# Patient Record
Sex: FEMALE | Race: WHITE | HISPANIC OR LATINO | Employment: UNEMPLOYED | URBAN - METROPOLITAN AREA
[De-identification: names, ages, dates, MRNs, and addresses within clinical notes are randomized per-mention and may not be internally consistent; named-entity substitution may affect disease eponyms.]

---

## 2017-08-12 PROCEDURE — 99284 EMERGENCY DEPT VISIT MOD MDM: CPT

## 2017-08-13 ENCOUNTER — HOSPITAL ENCOUNTER (EMERGENCY)
Facility: MEDICAL CENTER | Age: 37
End: 2017-08-13
Attending: EMERGENCY MEDICINE
Payer: COMMERCIAL

## 2017-08-13 VITALS
HEART RATE: 67 BPM | OXYGEN SATURATION: 94 % | DIASTOLIC BLOOD PRESSURE: 54 MMHG | WEIGHT: 164.68 LBS | SYSTOLIC BLOOD PRESSURE: 122 MMHG | BODY MASS INDEX: 28.12 KG/M2 | TEMPERATURE: 98.2 F | HEIGHT: 64 IN | RESPIRATION RATE: 18 BRPM

## 2017-08-13 DIAGNOSIS — G44.209 TENSION-TYPE HEADACHE, NOT INTRACTABLE, UNSPECIFIED CHRONICITY PATTERN: ICD-10-CM

## 2017-08-13 DIAGNOSIS — Z34.90 PREGNANCY, UNSPECIFIED GESTATIONAL AGE: ICD-10-CM

## 2017-08-13 LAB — HCG SERPL QL: POSITIVE

## 2017-08-13 PROCEDURE — 84703 CHORIONIC GONADOTROPIN ASSAY: CPT

## 2017-08-13 PROCEDURE — 700105 HCHG RX REV CODE 258: Performed by: EMERGENCY MEDICINE

## 2017-08-13 PROCEDURE — 700111 HCHG RX REV CODE 636 W/ 250 OVERRIDE (IP): Performed by: EMERGENCY MEDICINE

## 2017-08-13 PROCEDURE — 96374 THER/PROPH/DIAG INJ IV PUSH: CPT

## 2017-08-13 PROCEDURE — 96361 HYDRATE IV INFUSION ADD-ON: CPT

## 2017-08-13 PROCEDURE — 96375 TX/PRO/DX INJ NEW DRUG ADDON: CPT

## 2017-08-13 RX ORDER — DEXAMETHASONE SODIUM PHOSPHATE 4 MG/ML
10 INJECTION, SOLUTION INTRA-ARTICULAR; INTRALESIONAL; INTRAMUSCULAR; INTRAVENOUS; SOFT TISSUE ONCE
Status: COMPLETED | OUTPATIENT
Start: 2017-08-13 | End: 2017-08-13

## 2017-08-13 RX ORDER — KETOROLAC TROMETHAMINE 30 MG/ML
30 INJECTION, SOLUTION INTRAMUSCULAR; INTRAVENOUS ONCE
Status: COMPLETED | OUTPATIENT
Start: 2017-08-13 | End: 2017-08-13

## 2017-08-13 RX ORDER — LORAZEPAM 2 MG/ML
1 INJECTION INTRAMUSCULAR ONCE
Status: COMPLETED | OUTPATIENT
Start: 2017-08-13 | End: 2017-08-13

## 2017-08-13 RX ORDER — DIPHENHYDRAMINE HYDROCHLORIDE 50 MG/ML
25 INJECTION INTRAMUSCULAR; INTRAVENOUS ONCE
Status: COMPLETED | OUTPATIENT
Start: 2017-08-13 | End: 2017-08-13

## 2017-08-13 RX ORDER — SODIUM CHLORIDE 9 MG/ML
1000 INJECTION, SOLUTION INTRAVENOUS ONCE
Status: COMPLETED | OUTPATIENT
Start: 2017-08-13 | End: 2017-08-13

## 2017-08-13 RX ORDER — ONDANSETRON 2 MG/ML
4 INJECTION INTRAMUSCULAR; INTRAVENOUS ONCE
Status: COMPLETED | OUTPATIENT
Start: 2017-08-13 | End: 2017-08-13

## 2017-08-13 RX ADMIN — LORAZEPAM 1 MG: 2 INJECTION INTRAMUSCULAR; INTRAVENOUS at 01:02

## 2017-08-13 RX ADMIN — SODIUM CHLORIDE 1000 ML: 9 INJECTION, SOLUTION INTRAVENOUS at 00:50

## 2017-08-13 RX ADMIN — DEXAMETHASONE SODIUM PHOSPHATE 10 MG: 4 INJECTION, SOLUTION INTRAMUSCULAR; INTRAVENOUS at 01:02

## 2017-08-13 RX ADMIN — KETOROLAC TROMETHAMINE 30 MG: 30 INJECTION, SOLUTION INTRAMUSCULAR at 01:02

## 2017-08-13 RX ADMIN — DIPHENHYDRAMINE HYDROCHLORIDE 25 MG: 50 INJECTION, SOLUTION INTRAMUSCULAR; INTRAVENOUS at 02:22

## 2017-08-13 RX ADMIN — ONDANSETRON 4 MG: 2 INJECTION INTRAMUSCULAR; INTRAVENOUS at 01:02

## 2017-08-13 NOTE — ED AVS SNAPSHOT
8/13/2017    Karolyn Hurley  [address] [place] [address]    Dear Karolyn:    Critical access hospital wants to ensure your discharge home is safe and you or your loved ones have had all of your questions answered regarding your care after you leave the hospital.    Below is a list of resources and contact information should you have any questions regarding your hospital stay, follow-up instructions, or active medical symptoms.    Questions or Concerns Regarding… Contact   Medical Questions Related to Your Discharge  (7 days a week, 8am-5pm) Contact a Nurse Care Coordinator   [phone]   Medical Questions Not Related to Your Discharge  (24 hours a day / 7 days a week)  Contact the Nurse Health Line   [phone]    Medications or Discharge Instructions Refer to your discharge packet   or contact your [facility] Primary Care Provider   [phone]   Follow-up Appointment(s) Schedule your appointment via [employer]   or contact Scheduling [phone]   Billing Review your statement via [employer]  or contact Billing [phone]   Medical Records Review your records via [employer]   or contact Medical Records [phone]     You may receive a telephone call within two days of discharge. This call is to make certain you understand your discharge instructions and have the opportunity to have any questions answered. You can also easily access your medical information, test results and upcoming appointments via the [employer] free online health management tool. You can learn more and sign up at [employer]. For assistance setting up your [employer] account, please call [phone].    Once again, we want to ensure your discharge home is safe and that you have a clear understanding of any next steps in your care. If you have any questions or concerns, please do not hesitate to contact us, we are here for you. Thank you for choosing [facility] for your healthcare needs.    Sincerely,    Your [facility] Healthcare Team

## 2017-08-13 NOTE — ED NOTES
Pt given d/c instructions/follow up/home care instructions, pt verbalized understanding of POC, pt to ER lobby in wheelchair, to be driven home by .

## 2017-08-13 NOTE — ED AVS SNAPSHOT
Home Care Instructions                                                                                                                Karolyn Hurley   MRN: [id number]    Department:  [facility], Emergency Dept   Date of Visit:  8/12/2017            [facility], Emergency Dept    [address] [place] [id number]    Phone:  [phone]      You were seen by     Reena Feldman M.D.      Your Diagnosis Was     Tension-type headache, not intractable, unspecified chronicity pattern     G44.209       These are the medications you received during your hospitalization from 08/12/2017 2335 to 08/13/2017 0208     Date/Time Order Dose Route Action    08/13/2017 0050 NS infusion 1,000 mL 1,000 mL Intravenous New Bag    08/13/2017 0102 ondansetron (ZOFRAN) syringe/vial injection 4 mg 4 mg Intravenous Given    08/13/2017 0102 ketorolac (TORADOL) injection 30 mg 30 mg Intravenous Given    08/13/2017 0102 lorazepam (ATIVAN) injection 1 mg 1 mg Intravenous Given    08/13/2017 0102 dexamethasone (DECADRON) injection 10 mg 10 mg Intravenous Given      Follow-up Information     1. Follow up with [facility], Emergency Dept.    Specialty:  Emergency Medicine    Why:  Return for worsening pain, vomiting, abdominal pain, vaginal bleeding or other concerns    Contact information    [address] [place] [id number] [phone]      Medication Information     Review all of your home medications and newly ordered medications with your primary doctor and/or pharmacist as soon as possible. Follow medication instructions as directed by your doctor and/or pharmacist.     Please keep your complete medication list with you and share with your physician. Update the information when medications are discontinued, doses are changed, or new medications (including over-the-counter products) are added; and carry medication information at all times in the event of emergency  situations.               Medication List      Notice     You have not been prescribed any medications.            Procedures and tests performed during your visit     BETA-HCG QUALITATIVE SERUM    IV Saline Lock        Discharge Instructions       Dolor de rajiv general sin causa   (General Headache Without Cause)   Un dolor de rajiv en general es un dolor o malestar que se siente en la taran de la rajiv o del nikole. Se desconocen las causas.   CUIDADOS EN EL HOGAR   · Cumpla con los controles médicos según las indicaciones.  · [place] sólo los medicamentos que le haya indicado el médico.  · Cuando sienta dolor de rajiv acuéstese en un cuarto oscuro y tranquilo.  · Lleve un registro diario para averiguar si ciertas cosas provocan red de rajiv. Por ejemplo, escriba:  ¨ Lo que come y kvng.  ¨ Cuánto tiempo duerme.  ¨ Todo cambio en la dieta o medicamentos.  · Relájese recibiendo masajes o margarito otras actividades relajantes.  · Coloque hielo o calor en la rajiv y el nikole batool lo indique wayne médico.  · DISMINUYA EL NIVEL DE ESTRÉS  · Siéntase con la espalda recta. No apriete los músculos (tensione).  · Si fuma, deje de hacerlo.  · Mary Alice menos alcohol.  · Consuma menos cafeína o deje de tomarla.  · Coma y duerma en horarios regulares.  · Duerma entre 7 y 9 horas o batool le indique wayne médico.  · Mantenga las luces tenues si le molesta las luces brillantes o ashley red de rajiv empeoran.  SOLICITE AYUDA DE INMEDIATO SI:   · El dolor de rajiv empeora.  · Tiene fiebre.  · Presenta rigidez en el nikole.  · Tiene dificultad para rin.  · Ashley músculos están débiles o pierde el control muscular.  · Pierde equilibrio o tiene problemas para caminar.  · Siente que se desvanece (debilidad) o se desmaya.  · Tiene síntomas intensos que son diferentes a los primeros síntomas.  · Tiene problemas con los medicamentos que le recetó wayne médico.  · El medicamento no le hace efecto.  · Siente que el dolor de rajiv es diferente a  otros red de rajiv.  · Tiene malestar estomacal (náuseas) o (vómitos).     Esta información no tiene batool fin reemplazar el consejo del médico. Asegúrese de hacerle al médico cualquier pregunta que tenga.     Document Released: 03/11/2013 Document Revised: 05/03/2016  [employer] Interactive Patient Education ©2016 [employer]            Patient Information     Patient Information    Following emergency treatment: all patient requiring follow-up care must return either to a private physician or a clinic if your condition worsens before you are able to obtain further medical attention, please return to the emergency room.     Billing Information    At [facility], we work to make the billing process streamlined for our patients.  Our Representatives are here to answer any questions you may have regarding your hospital bill.  If you have insurance coverage and have supplied your insurance information to us, we will submit a claim to your insurer on your behalf.  Should you have any questions regarding your bill, we can be reached online or by phone as follows:  Online: You are able pay your bills online or live chat with our representatives about any billing questions you may have. We are here to help Monday - Friday from 8:00am to 7:30pm and 9:00am - 12:00pm on Saturdays.  Please visit [email or web address]  for more information.   Phone:  [phone] or [phone]    Please note that your emergency physician, surgeon, pathologist, radiologist, anesthesiologist, and other specialists are not employed by [facility] and will therefore bill separately for their services.  Please contact them directly for any questions concerning their bills at the numbers below:     Emergency Physician Services:  [phone]  [facility]:  [phone]  [facility]:  [phone]  [facility]:  [phone]    1. Your final bill may vary from the  amount quoted upon discharge if all procedures are not complete at that time, or if your doctor has additional procedures of which we are not aware. You will receive an additional bill if you return to the Emergency Department at [facility] for suture removal regardless of the facility of which the sutures were placed.     2. Please arrange for settlement of this account at the emergency registration.    3. All self-pay accounts are due in full at the time of treatment.  If you are unable to meet this obligation then payment is expected within 4-5 days.     4. If you have had radiology studies (CT, X-ray, Ultrasound, MRI), you have received a preliminary result during your emergency department visit. Please contact the radiology department [phone] to receive a copy of your final result. Please discuss the Final result with your primary physician or with the follow up physician provided.     Crisis Hotline:  [place] Crisis Hotline:  3-105-KDYMIFG or 1-407.289.2199  [place] Crisis Hotline:    1-665.961.9630 or 139-344-2008         ED Discharge Follow Up Questions    1. In order to provide you with very good care, we would like to follow up with a phone call in the next few days.  May we have your permission to contact you?     YES /  NO    2. What is the best phone number to call you? (       )_____-__________    3. What is the best time to call you?      Morning  /  Afternoon  /  Evening                   Patient Signature:  ____________________________________________________________    Date:  ____________________________________________________________

## 2017-08-13 NOTE — ED NOTES
.  Chief Complaint   Patient presents with   • Headache     pt with headache for last 5 days, denies any trauma to head, aox4, co n/v states unable to sleep due to headache   • N/V     .Pt Informed regarding triage process and verbalized understanding to inform triage tech or RN for any changes in condition.  Placed in lobby.

## 2017-08-13 NOTE — ED AVS SNAPSHOT
[employer] Access Code: Activation code not generated  Current [employer] Status: Active    [employer]  A secure, online tool to manage your health information     [employer]’s [employer]® is a secure, online tool that connects you to your personalized health information from the privacy of your home -- day or night - making it very easy for you to manage your healthcare. Once the activation process is completed, you can even access your medical information using the [employer] mary, which is available for free in the Apple Mary store or Google Play store.     [employer] provides the following levels of access (as shown below):   My Chart Features   [facility] Primary Care Doctor [facility]  Specialists [facility]  Urgent  Care Non-[facility]  Primary Care  Doctor   Email your healthcare team securely and privately 24/7 X X X X   Manage appointments: schedule your next appointment; view details of past/upcoming appointments X      Request prescription refills. X      View recent personal medical records, including lab and immunizations X X X X   View health record, including health history, allergies, medications X X X X   Read reports about your outpatient visits, procedures, consult and ER notes X X X X   See your discharge summary, which is a recap of your hospital and/or ER visit that includes your diagnosis, lab results, and care plan. X X       How to register for [employer]:  1. Go to  [email or web address].  2. Click on the Sign Up Now box, which takes you to the New Member Sign Up page. You will need to provide the following information:  a. Enter your [employer] Access Code exactly as it appears at the top of this page. (You will not need to use this code after you’ve completed the sign-up process. If you do not sign up before the expiration date, you must request a new code.)   b. Enter your date of birth.   c. Enter your home email address.   d. Click Submit, and follow the next screen’s instructions.  3. Create a [employer] ID. This will  be your [employer]iza login ID and cannot be changed, so think of one that is secure and easy to remember.  4. Create a [employer] password. You can change your password at any time.  5. Enter your Password Reset Question and Answer. This can be used at a later time if you forget your password.   6. Enter your e-mail address. This allows you to receive e-mail notifications when new information is available in Analiza.  7. Click Sign Up. You can now view your health information.    For assistance activating your [employer] account, call [phone]

## 2017-08-13 NOTE — ED NOTES
This nurse called to lobby pt is laying on floor, not speaking to staff nods head to answer yes and no questions, pt family member states pt was going to leave and go to [facility] and became faint and was assisted to floor by family member, denies hitting head, pt lifted to bed and report to Ramirez MAHER

## 2017-08-13 NOTE — ED PROVIDER NOTES
"ED Provider Note    Scribed for Reena eFldman M.D. by Randal Souza. 8/13/2017, 12:24 AM.    Primary care provider: Pcp Pt States None  Means of arrival: Walk-In  History obtained from: Patient  History limited by: None    CHIEF COMPLAINT  Chief Complaint   Patient presents with   • Headache     pt with headache for last 5 days, denies any trauma to head, aox4, co n/v states unable to sleep due to headache   • N/V     HPI  Karolyn Hurley is a 36 y.o. female who presents to the Emergency Department complaining of a severe occipital headache onset 5 days ago. Due to the pain, she has been unable to sleep at all the past few days. She notes she's had a lot of stress as her brother passed away recently. The patient has a history of headaches but they are not similar in any way to her current headache. One day ago the patient started have associated nausea and vomiting. She tried Ibuprofen, Excedrin, and Melatonin one time each with mild relief of her symptoms. Denies photophobia, abdominal pain. The nurse informed me afterwards the patient's period is 1 week late.    REVIEW OF SYSTEMS  Pertinent positives include headache, nausea, vomiting. Pertinent negatives include no photophobia, abdominal pain.  All other systems reviewed and negative.    C.    PAST MEDICAL HISTORY         SURGICAL HISTORY  patient denies any surgical history    SOCIAL HISTORY  Social History   Substance Use Topics   • Smoking status: Never Smoker    • Smokeless tobacco: None   • Alcohol Use: No      History   Drug Use No       FAMILY HISTORY  History reviewed. No pertinent family history.    CURRENT MEDICATIONS  Ibuprofin, Excedrin, Melatonin    ALLERGIES  Allergies   Allergen Reactions   • Nkda [No Known Drug Allergy]        PHYSICAL EXAM  VITAL SIGNS: /54 mmHg  Pulse 79  Temp(Src) 36.8 °C (98.2 °F) (Temporal)  Resp 18  Ht 1.626 m (5' 4\")  Wt 74.7 kg (164 lb 10.9 oz)  BMI 28.25 kg/m2  SpO2 97%  LMP 07/07/2017 " (Approximate)  Breastfeeding? Unknown  Constitutional: Alert and anxious.  HENT: No signs of trauma, Bilateral external ears normal, Nose normal.   Eyes: Pupils are equal and reactive, Conjunctiva normal, Non-icteric.   Neck: Normal range of motion, No tenderness, Supple, No stridor.   Cardiovascular: Regular rate and rhythm, no murmurs.   Thorax & Lungs: Normal breath sounds, No respiratory distress, No wheezing, No chest tenderness.   Abdomen: Bowel sounds normal, Soft, No tenderness, No masses, No peritoneal signs.  Skin: Warm, Dry, No erythema, No rash.   Neurologic: Alert, moving all extremities without difficulty, no focal deficit. No neurological deficits. Appears anxious.    LABS  Results for orders placed or performed during the hospital encounter of 08/13/17   BETA-HCG QUALITATIVE SERUM   Result Value Ref Range    Beta-Hcg Qualitative Serum Positive (A) Negative     All labs reviewed by me.    COURSE & MEDICAL DECISION MAKING  Pertinent Labs & Imaging studies reviewed. (See chart for details)    Differential diagnoses include but are not limited to: Anxiety, Dehydration, Tension headache    12:24 AM - Patient seen and examined at bedside. Patient will be treated with 10mg Decadron, 1mg Ativan, 30mg Toradol, 4mg Zofran, IV fluids for hydration. Ordered Beta-HCG Qualitive Serum to evaluate her symptoms.    2:00 AM - Patient seen at bedside and is feeling better without any abdominal pain or cramping. I explained the lab results noted above which indicate the patient is pregnant. I did explain that she was given medications earlier prior to knowing that she is pregnant. She understands this. She will not take additional NSAIDs. She feels much better at this time. She does not have any symptoms concerning for infection or hemorrhage. I suspect this is tension related. The patient feels comfortable to be discharged home. She was given return precautions and understands and verbalized agreement.      The  patient will return for new or worsening symptoms and is stable at the time of discharge. Patient was given return precautions. Patient and/or family member verbalizes understanding and will comply.    DISPOSITION:  Patient will be discharged home in stable condition.    FOLLOW UP:  [facility], Emergency Dept  [address]  [place] [id number] [phone]    Return for worsening pain, vomiting, abdominal pain, vaginal bleeding or other concerns    FINAL IMPRESSION  1. Tension-type headache, not intractable, unspecified chronicity pattern    2. Pregnancy, unspecified gestational age      This dictation has been created using voice recognition software and/or scribes. The accuracy of the dictation is limited by the abilities of the software and the expertise of the scribes. I expect there may be some errors of grammar and possibly content. I made every attempt to manually correct the errors within my dictation. However, errors related to voice recognition software and/or scribes may still exist and should be interpreted within the appropriate context.     I, Randal Souza (Scribe), am scribing for, and in the presence of, Reena Feldman M.D..    Electronically signed by: Randal Souza (Scribe), 8/13/2017    Reena LARKIN M.D. personally performed the services described in this documentation, as scribed by Randal Souza in my presence, and it is both accurate and complete.    The note accurately reflects work and decisions made by me.  Reena Feldman  8/13/2017  5:12 AM

## 2017-08-13 NOTE — DISCHARGE INSTRUCTIONS
Dolor de rajiv general sin causa   (General Headache Without Cause)   Un dolor de rajiv en general es un dolor o malestar que se siente en la taran de la rajiv o del nikole. Se desconocen las causas.   CUIDADOS EN EL HOGAR   · Cumpla con los controles médicos según las indicaciones.  · [place] sólo los medicamentos que le haya indicado el médico.  · Cuando sienta dolor de rajiv acuéstese en un cuarto oscuro y tranquilo.  · Lleve un registro diario para averiguar si ciertas cosas provocan red de rajiv. Por ejemplo, escriba:  ¨ Lo que come y kvng.  ¨ Cuánto tiempo duerme.  ¨ Todo cambio en la dieta o medicamentos.  · Relájese recibiendo masajes o margarito otras actividades relajantes.  · Coloque hielo o calor en la rajiv y el nikole batool lo indique wayne médico.  · DISMINUYA EL NIVEL DE ESTRÉS  · Siéntase con la espalda recta. No apriete los músculos (tensione).  · Si fuma, deje de hacerlo.  · [place] menos alcohol.  · Consuma menos cafeína o deje de tomarla.  · Coma y duerma en horarios regulares.  · Duerma entre 7 y 9 horas o batool le indique wayne médico.  · Mantenga las luces tenues si le molesta las luces brillantes o ashley red de rajiv empeoran.  SOLICITE AYUDA DE INMEDIATO SI:   · El dolor de rajiv empeora.  · Tiene fiebre.  · Presenta rigidez en el nikole.  · Tiene dificultad para rin.  · Ashley músculos están débiles o pierde el control muscular.  · Pierde equilibrio o tiene problemas para caminar.  · Siente que se desvanece (debilidad) o se desmaya.  · Tiene síntomas intensos que son diferentes a los primeros síntomas.  · Tiene problemas con los medicamentos que le recetó wayne médico.  · El medicamento no le hace efecto.  · Siente que el dolor de rajiv es diferente a otros red de rajiv.  · Tiene malestar estomacal (náuseas) o (vómitos).     Esta información no tiene batool fin reemplazar el consejo del médico. Asegúrese de hacerle al médico cualquier pregunta que tenga.     Document Released: 03/11/2013 Document  Revised: 05/03/2016  Elsevier Interactive Patient Education ©2016 Elsevier Inc.

## 2017-08-14 ENCOUNTER — HOSPITAL ENCOUNTER (EMERGENCY)
Facility: MEDICAL CENTER | Age: 37
End: 2017-08-14
Attending: EMERGENCY MEDICINE
Payer: COMMERCIAL

## 2017-08-14 VITALS
DIASTOLIC BLOOD PRESSURE: 68 MMHG | BODY MASS INDEX: 29.3 KG/M2 | RESPIRATION RATE: 15 BRPM | WEIGHT: 165.34 LBS | SYSTOLIC BLOOD PRESSURE: 112 MMHG | HEART RATE: 84 BPM | HEIGHT: 63 IN | TEMPERATURE: 98.6 F | OXYGEN SATURATION: 97 %

## 2017-08-14 DIAGNOSIS — R51.9 OCCIPITAL HEADACHE: ICD-10-CM

## 2017-08-14 PROCEDURE — 99284 EMERGENCY DEPT VISIT MOD MDM: CPT

## 2017-08-14 PROCEDURE — A9270 NON-COVERED ITEM OR SERVICE: HCPCS | Performed by: EMERGENCY MEDICINE

## 2017-08-14 PROCEDURE — 96375 TX/PRO/DX INJ NEW DRUG ADDON: CPT

## 2017-08-14 PROCEDURE — 700102 HCHG RX REV CODE 250 W/ 637 OVERRIDE(OP): Performed by: EMERGENCY MEDICINE

## 2017-08-14 PROCEDURE — 96374 THER/PROPH/DIAG INJ IV PUSH: CPT

## 2017-08-14 PROCEDURE — 700111 HCHG RX REV CODE 636 W/ 250 OVERRIDE (IP): Performed by: EMERGENCY MEDICINE

## 2017-08-14 RX ORDER — HYDROCODONE BITARTRATE AND ACETAMINOPHEN 5; 325 MG/1; MG/1
1-2 TABLET ORAL EVERY 6 HOURS PRN
Qty: 12 TAB | Refills: 0 | Status: SHIPPED | OUTPATIENT
Start: 2017-08-14 | End: 2017-08-24

## 2017-08-14 RX ORDER — HYDROCODONE BITARTRATE AND ACETAMINOPHEN 5; 325 MG/1; MG/1
1 TABLET ORAL ONCE
Status: COMPLETED | OUTPATIENT
Start: 2017-08-14 | End: 2017-08-14

## 2017-08-14 RX ORDER — METOCLOPRAMIDE HYDROCHLORIDE 5 MG/ML
10 INJECTION INTRAMUSCULAR; INTRAVENOUS ONCE
Status: COMPLETED | OUTPATIENT
Start: 2017-08-14 | End: 2017-08-14

## 2017-08-14 RX ORDER — METOCLOPRAMIDE 10 MG/1
10 TABLET ORAL EVERY 8 HOURS PRN
Qty: 12 TAB | Refills: 0 | Status: SHIPPED | OUTPATIENT
Start: 2017-08-14 | End: 2017-09-26

## 2017-08-14 RX ORDER — ACETAMINOPHEN 325 MG/1
650 TABLET ORAL ONCE
Status: COMPLETED | OUTPATIENT
Start: 2017-08-14 | End: 2017-08-14

## 2017-08-14 RX ORDER — DIPHENHYDRAMINE HYDROCHLORIDE 50 MG/ML
25 INJECTION INTRAMUSCULAR; INTRAVENOUS ONCE
Status: COMPLETED | OUTPATIENT
Start: 2017-08-14 | End: 2017-08-14

## 2017-08-14 RX ADMIN — ACETAMINOPHEN 650 MG: 325 TABLET, FILM COATED ORAL at 19:03

## 2017-08-14 RX ADMIN — HYDROCODONE BITARTRATE AND ACETAMINOPHEN 1 TABLET: 5; 325 TABLET ORAL at 20:48

## 2017-08-14 RX ADMIN — METOCLOPRAMIDE 10 MG: 5 INJECTION, SOLUTION INTRAMUSCULAR; INTRAVENOUS at 19:03

## 2017-08-14 RX ADMIN — DIPHENHYDRAMINE HYDROCHLORIDE 25 MG: 50 INJECTION, SOLUTION INTRAMUSCULAR; INTRAVENOUS at 19:03

## 2017-08-14 ASSESSMENT — ENCOUNTER SYMPTOMS
BLURRED VISION: 1
HEADACHES: 1
VOMITING: 0
NECK PAIN: 1
TINGLING: 1
CHILLS: 1
PHOTOPHOBIA: 1
ABDOMINAL PAIN: 0
FEVER: 0

## 2017-08-14 ASSESSMENT — PAIN SCALES - GENERAL: PAINLEVEL_OUTOF10: 10

## 2017-08-14 NOTE — ED AVS SNAPSHOT
8/14/2017    Karolyn Hurley  [address] [place] [address]    Dear Karolyn:    [facility] wants to ensure your discharge home is safe and you or your loved ones have had all of your questions answered regarding your care after you leave the hospital.    Below is a list of resources and contact information should you have any questions regarding your hospital stay, follow-up instructions, or active medical symptoms.    Questions or Concerns Regarding… Contact   Medical Questions Related to Your Discharge  (7 days a week, 8am-5pm) Contact a Nurse Care Coordinator   [phone]   Medical Questions Not Related to Your Discharge  (24 hours a day / 7 days a week)  Contact the Nurse Health Line   [phone]    Medications or Discharge Instructions Refer to your discharge packet   or contact your [facility] Primary Care Provider   [phone]   Follow-up Appointment(s) Schedule your appointment via "[employer]   or contact Scheduling [phone]   Billing Review your statement via "[employer]  or contact Billing [phone]   Medical Records Review your records via "[employer]   or contact Medical Records [phone]     You may receive a telephone call within two days of discharge. This call is to make certain you understand your discharge instructions and have the opportunity to have any questions answered. You can also easily access your medical information, test results and upcoming appointments via the "[employer] free online health management tool. You can learn more and sign up at [employer]. For assistance setting up your "[employer] account, please call [phone].    Once again, we want to ensure your discharge home is safe and that you have a clear understanding of any next steps in your care. If you have any questions or concerns, please do not hesitate to contact us, we are here for you. Thank you for choosing [facility] for your healthcare needs.    Sincerely,    Your [facility] Healthcare Team

## 2017-08-14 NOTE — ED AVS SNAPSHOT
Home Care Instructions                                                                                                                Karolyn Hurley   MRN: [id number]    Department:  [facility], Emergency Dept   Date of Visit:  8/14/2017            [facility], Emergency Dept    [address] [address]    Phone:  [phone]      You were seen by     Damián Garcia M.D.      Your Diagnosis Was     Occipital headache     R51       These are the medications you received during your hospitalization from 08/14/2017 1720 to 08/14/2017 2120     Date/Time Order Dose Route Action    08/14/2017 1903 acetaminophen (TYLENOL) tablet 650 mg 650 mg Oral Given    08/14/2017 1903 metoclopramide (REGLAN) injection 10 mg 10 mg Intravenous Given    08/14/2017 1903 diphenhydrAMINE (BENADRYL) injection 25 mg 25 mg Intravenous Given    08/14/2017 2048 hydrocodone-acetaminophen (NORCO) 5-325 MG per tablet 1 Tab 1 Tab Oral Given      Follow-up Information     1. Follow up with Your Physician. Schedule an appointment as soon as possible for a visit in 3 days.    Specialty:  Emergency Medicine    Contact information    Varies          2. Follow up with [facility].    Why:  If you need a doctor    Contact information    [address]  [place] [address] [phone]          3. Follow up with [facility].    Contact information    [address] [address] [phone]      Medication Information     Review all of your home medications and newly ordered medications with your primary doctor and/or pharmacist as soon as possible. Follow medication instructions as directed by your doctor and/or pharmacist.     Please keep your complete medication list with you and share with your physician. Update the information when medications are discontinued, doses are changed, or new medications (including over-the-counter products) are added; and carry  medication information at all times in the event of emergency situations.               Medication List      START taking these medications        Instructions    Morning Afternoon Evening Bedtime    hydrocodone-acetaminophen 5-325 MG Tabs per tablet   Last time this was given:  1 Tab on 8/14/2017  8:48 PM   Commonly known as:  NORCO        Take 1-2 Tabs by mouth every 6 hours as needed.   Dose:  1-2 Tab                        metoclopramide 10 MG Tabs   Commonly known as:  REGLAN        Take 1 Tab by mouth every 8 hours as needed (Nause or headache).   Dose:  10 mg                             Where to Get Your Medications      You can get these medications from any pharmacy     Bring a paper prescription for each of these medications    - hydrocodone-acetaminophen 5-325 MG Tabs per tablet  - metoclopramide 10 MG Tabs            Procedures and tests performed during your visit     IV Saline Lock        Discharge Instructions       Return if you have increasing headache, confusion, fever, neck stiffness, vision changes, or headache will not go away at all.     Cefalea tensional   (Tension Headache)   Mayra cefalea tensional es mayra sensación de dolor y opresión en la frente y los lados de la rajiv. El dolor puede ser sordo o puede sentirse que comprime (constrictivo). Shaina es el tipo más común de dolor de rajiv. Generalmente no se asocian con náuseas o vómitos y no empeoran con la actividad física. Pueden durar desde 30 minutos a varios días.   CAUSAS   Se desconoce la causa exacta, en puede ser causada por las sustancias químicas y las hormonas del cerebro que producen dolor. Suelen comenzar después de mayra situación de estrés, ansiedad o por depresión. Otros desencadenantes pueden ser:   · El alcohol.  · Cafeína (demasiada o abstinencia).  · Infecciones respiratorias (resfriados, gripes, sinusitis).  · Problemas dentales o apretar los dientes.  · Fatiga.  · Mantener la rajiv y el nikole en mayra posición demasiado  "tiempo mientras utiliza un ordenador.  SÍNTOMAS   · Presión alrededor de la rajiv.    · Dolor \"sordo\" en la rajiv.    · Dolor que siente sobre la frente y los lados de la rajiv.    · Sensibilidad en los músculos de la rajiv, del nikole y de los hombros.  DIAGNÓSTICO   El diagnóstico se realiza en base a:   · Síntomas.    · Examen físico.    · Kaleigh TC (tomografía computada) o resonancia magnética de la rajiv. Se pueden pedir estas pruebas, si los síntomas son severos o inusuales.  TRATAMIENTO   Le recetarán medicamentos que ayudan a aliviar los síntomas.   INSTRUCCIONES PARA EL CUIDADO EN EL HOGAR   · Sólo tome medicamentos de venta george o recetados para calmar el dolor o el malestar, según las indicaciones de wayne médico.    · Cuando sienta dolor de rajiv acuéstese en un cuarto oscuro y tranquilo.    · Lleve un registro diario para averiguar lo que puede desencadenar los red de rajiv. Por ejemplo, escriba:  · Lo que come y kvng.  · Cuánto tiempo duerme.  · Todo cambio en la dieta o medicamentos.  · Trate con masajes u otras técnicas de relajación.    · Pueden utilizarse bolsas de hielo o calor aplicadas a la rajiv o al nikole. Úselos 3 a 4 veces por día de 15 a 20 minutos por vez, o batool sea necesario.    · Limite las situaciones de estrés.    · Siéntese con la espalda recta y no tense los músculos.    · Si fuma, deje de hacerlo.  · Limite el consumo de bebidas alcohólicas.  · Consuma menos cantidad de cafeína o deje de tomarla.  · Coma y margarito ejercicios regularmente.  · Duerma entre 7 y 9 horas o batool lo indique wayne médico.  · Evite el uso excesivo de medicamentos para el dolor batool el dolor recurrente de rajiv que pueden ocurrir.     SOLICITE ATENCIÓN MÉDICA SI:   · Tiene problemas con los medicamentos que le recetaron.  · El medicamento no le hace efecto.  · El dolor de rajiv que sentía habitualmente es diferente.  · Tiene náuseas o vómitos.  SOLICITE ATENCIÓN MÉDICA DE INMEDIATO SI:   · El dolor se " hace cada vez más intenso.  · Tiene fiebre.  · Presenta rigidez en el nikole.  · Sufre pérdida de la visión.  · Presenta debilidad muscular o pérdida del control muscular.  · Pierde equilibrio o tiene problemas para caminar.  · Sufre mareos o se desmaya.  · Tiene síntomas graves que son diferentes a los primeros síntomas.  ASEGÚRESE DE QUE:   · Comprende estas instrucciones.  · Controlará wayne enfermedad.  · Solicitará ayuda de inmediato si no mejora o si empeora.  Document Released: 09/27/2006 Document Revised: 03/11/2013  [employer] (ML4D)® Patient Information ©2014 [employer].                Patient Information     Patient Information    Following emergency treatment: all patient requiring follow-up care must return either to a private physician or a clinic if your condition worsens before you are able to obtain further medical attention, please return to the emergency room.     Billing Information    At [facility], we work to make the billing process streamlined for our patients.  Our Representatives are here to answer any questions you may have regarding your hospital bill.  If you have insurance coverage and have supplied your insurance information to us, we will submit a claim to your insurer on your behalf.  Should you have any questions regarding your bill, we can be reached online or by phone as follows:  Online: You are able pay your bills online or live chat with our representatives about any billing questions you may have. We are here to help Monday - Friday from 8:00am to 7:30pm and 9:00am - 12:00pm on Saturdays.  Please visit [email or web address]  for more information.   Phone:  [phone] or [phone]    Please note that your emergency physician, surgeon, pathologist, radiologist, anesthesiologist, and other specialists are not employed by [facility] and will therefore bill separately for their services.  Please contact them directly for any questions concerning their  bills at the numbers below:     Emergency Physician Services:  [phone]  [facility]:  [phone]  [facility]:  [phone]  [facility]:  [phone]    1. Your final bill may vary from the amount quoted upon discharge if all procedures are not complete at that time, or if your doctor has additional procedures of which we are not aware. You will receive an additional bill if you return to the Emergency Department at [facility] for suture removal regardless of the facility of which the sutures were placed.     2. Please arrange for settlement of this account at the emergency registration.    3. All self-pay accounts are due in full at the time of treatment.  If you are unable to meet this obligation then payment is expected within 4-5 days.     4. If you have had radiology studies (CT, X-ray, Ultrasound, MRI), you have received a preliminary result during your emergency department visit. Please contact the radiology department [phone] to receive a copy of your final result. Please discuss the Final result with your primary physician or with the follow up physician provided.     Crisis Hotline:  [place] Crisis Hotline:  1-220-ANSBZUI or 1-728.576.3064  [place] Crisis Hotline:    1-855.647.9838 or 538-554-2748         ED Discharge Follow Up Questions    1. In order to provide you with very good care, we would like to follow up with a phone call in the next few days.  May we have your permission to contact you?     YES /  NO    2. What is the best phone number to call you? (       )_____-__________    3. What is the best time to call you?      Morning  /  Afternoon  /  Evening                   Patient Signature:  ____________________________________________________________    Date:  ____________________________________________________________

## 2017-08-14 NOTE — ED AVS SNAPSHOT
Buzztala Access Code: Activation code not generated  Current Buzztala Status: Active    [employer]uuConnecthart  A secure, online tool to manage your health information     [facility]’s Buzztala® is a secure, online tool that connects you to your personalized health information from the privacy of your home -- day or night - making it very easy for you to manage your healthcare. Once the activation process is completed, you can even access your medical information using the Buzztala mary, which is available for free in the Apple Mary store or Google Play store.     Buzztala provides the following levels of access (as shown below):   My Chart Features   [facility] Primary Care Doctor [facility]  Specialists [facility]  Urgent  Care Non-[facility]  Primary Care  Doctor   Email your healthcare team securely and privately 24/7 X X X X   Manage appointments: schedule your next appointment; view details of past/upcoming appointments X      Request prescription refills. X      View recent personal medical records, including lab and immunizations X X X X   View health record, including health history, allergies, medications X X X X   Read reports about your outpatient visits, procedures, consult and ER notes X X X X   See your discharge summary, which is a recap of your hospital and/or ER visit that includes your diagnosis, lab results, and care plan. X X       How to register for Buzztala:  1. Go to  [email or web address].  2. Click on the Sign Up Now box, which takes you to the New Member Sign Up page. You will need to provide the following information:  a. Enter your Buzztala Access Code exactly as it appears at the top of this page. (You will not need to use this code after you’ve completed the sign-up process. If you do not sign up before the expiration date, you must request a new code.)   b. Enter your date of birth.   c. Enter your home email address.   d. Click Submit, and follow the next screen’s instructions.  3. Create a Buzztala ID. This will  be your [employer] login ID and cannot be changed, so think of one that is secure and easy to remember.  4. Create a [employer] password. You can change your password at any time.  5. Enter your Password Reset Question and Answer. This can be used at a later time if you forget your password.   6. Enter your e-mail address. This allows you to receive e-mail notifications when new information is available in [employer].  7. Click Sign Up. You can now view your health information.    For assistance activating your [employer] account, call [phone]

## 2017-08-15 NOTE — DISCHARGE INSTRUCTIONS
"Return if you have increasing headache, confusion, fever, neck stiffness, vision changes, or headache will not go away at all.     Cefalea tensional   (Tension Headache)   Mayra cefalea tensional es mayra sensación de dolor y opresión en la frente y los lados de la rajiv. El dolor puede ser sordo o puede sentirse que comprime (constrictivo). Shaina es el tipo más común de dolor de rajiv. Generalmente no se asocian con náuseas o vómitos y no empeoran con la actividad física. Pueden durar desde 30 minutos a varios días.   CAUSAS   Se desconoce la causa exacta, en puede ser causada por las sustancias químicas y las hormonas del cerebro que producen dolor. Suelen comenzar después de mayra situación de estrés, ansiedad o por depresión. Otros desencadenantes pueden ser:   · El alcohol.  · Cafeína (demasiada o abstinencia).  · Infecciones respiratorias (resfriados, gripes, sinusitis).  · Problemas dentales o apretar los dientes.  · Fatiga.  · Mantener la rajiv y el nikole en mayra posición demasiado tiempo mientras utiliza un ordenador.  SÍNTOMAS   · Presión alrededor de la rajiv.    · Dolor \"sordo\" en la rajiv.    · Dolor que siente sobre la frente y los lados de la rajiv.    · Sensibilidad en los músculos de la rajiv, del nikole y de los hombros.  DIAGNÓSTICO   El diagnóstico se realiza en base a:   · Síntomas.    · Examen físico.    · Mayra TC (tomografía computada) o resonancia magnética de la rajiv. Se pueden pedir estas pruebas, si los síntomas son severos o inusuales.  TRATAMIENTO   Le recetarán medicamentos que ayudan a aliviar los síntomas.   INSTRUCCIONES PARA EL CUIDADO EN EL HOGAR   · Sólo tome medicamentos de venta george o recetados para calmar el dolor o el malestar, según las indicaciones de wayne médico.    · Cuando sienta dolor de rajiv acuéstese en un cuarto oscuro y tranquilo.    · Lleve un registro diario para averiguar lo que puede desencadenar los red de rajiv. Por ejemplo, escriba:  · Lo que come y " kvng.  · Cuánto tiempo duerme.  · Todo cambio en la dieta o medicamentos.  · Trate con masajes u otras técnicas de relajación.    · Pueden utilizarse bolsas de hielo o calor aplicadas a la rajiv o al nikole. Úselos 3 a 4 veces por día de 15 a 20 minutos por vez, o batool sea necesario.    · Limite las situaciones de estrés.    · Siéntese con la espalda recta y no tense los músculos.    · Si fuma, deje de hacerlo.  · Limite el consumo de bebidas alcohólicas.  · Consuma menos cantidad de cafeína o deje de tomarla.  · Coma y margarito ejercicios regularmente.  · Duerma entre 7 y 9 horas o batool lo indique wayne médico.  · Evite el uso excesivo de medicamentos para el dolor batool el dolor recurrente de rajiv que pueden ocurrir.     SOLICITE ATENCIÓN MÉDICA SI:   · Tiene problemas con los medicamentos que le recetaron.  · El medicamento no le hace efecto.  · El dolor de rajiv que sentía habitualmente es diferente.  · Tiene náuseas o vómitos.  SOLICITE ATENCIÓN MÉDICA DE INMEDIATO SI:   · El dolor se hace cada vez más intenso.  · Tiene fiebre.  · Presenta rigidez en el nikole.  · Sufre pérdida de la visión.  · Presenta debilidad muscular o pérdida del control muscular.  · Pierde equilibrio o tiene problemas para caminar.  · Sufre mareos o se desmaya.  · Tiene síntomas graves que son diferentes a los primeros síntomas.  ASEGÚRESE DE QUE:   · Comprende estas instrucciones.  · Controlará wayne enfermedad.  · Solicitará ayuda de inmediato si no mejora o si empeora.  Document Released: 09/27/2006 Document Revised: 03/11/2013  ExitCare® Patient Information ©2014 [employer].

## 2017-08-15 NOTE — ED NOTES
Pt amb to triage.  Chief Complaint   Patient presents with   • Head Ache     x1wk, seen here for the same several days ago, no relief w/ otc meds

## 2017-08-15 NOTE — ED PROVIDER NOTES
ED Provider Note    Scribed for Damián Garcia M.D. by Mary Kruse. 2017, 6:36 PM.    Primary care provider: Pcp Pt States None  Means of arrival: walk in   History obtained from: patient   History limited by: none       CHIEF COMPLAINT  Chief Complaint   Patient presents with   • Head Ache     x1wk, seen here for the same several days ago, no relief w/ otc meds       HPI  Karolyn Hurley is a 36 y.o. female who presents to the Emergency Department complaining of an intermittent headache onset one week ago. She came into the ED today after she was unable to sleep due to her pain. Her headache is located to her posterior head and bilateral temples. She confirms her headache developed gradually with onset. Light exacerbates her headache. Patient denies history of frequent headaches or migraines and reports that she normally has a headache once every few months. She denies history of similar headaches. Patient confirms recent increase in her daily stress after the recent death of her brother. Patient has been taking tylenol with no relief of her symptoms.  She has associated neck pain with tingling, intermittent chills and blurred vision. She denies recent vomiting, abdominal pain, vaginal bleeding and fever. The patient was seen and evaluated yesterday for the same complaint. She had a positive pregnancy test with this visit. Her last menstrual period was .       REVIEW OF SYSTEMS  Review of Systems   Constitutional: Positive for chills. Negative for fever.   Eyes: Positive for blurred vision and photophobia.   Gastrointestinal: Negative for vomiting and abdominal pain.   Genitourinary:        No vaginal bleeding    Musculoskeletal: Positive for neck pain.   Neurological: Positive for tingling and headaches.   E.      PAST MEDICAL HISTORY   Her obstetric history is .       SURGICAL HISTORY  patient denies any surgical history      SOCIAL HISTORY  Social History   Substance Use Topics  "  • Smoking status: Never Smoker         • Alcohol Use: No      History   Drug Use No       FAMILY HISTORY  None noted       CURRENT MEDICATIONS  Home Medications     Reviewed by Trinh Solis R.N. (Registered Nurse) on 08/14/17 at 1848  Med List Status: Not Addressed    Medication Last Dose Status          Patient Juma Taking any Medications                        ALLERGIES  Allergies   Allergen Reactions   • Nkda [No Known Drug Allergy]          PHYSICAL EXAM  VITAL SIGNS: /67 mmHg  Pulse 90  Temp(Src) 37.4 °C (99.3 °F) (Temporal)  Resp 16  Ht 1.6 m (5' 3\")  Wt 75 kg (165 lb 5.5 oz)  BMI 29.30 kg/m2  SpO2 97%  LMP 07/07/2017 (Approximate)  Constitutional: Well developed, Well nourished, mild distress.   HENT: Normocephalic, Atraumatic  Eyes: Conjunctiva normal, No discharge.   Neck: Supple, No stridor, muscle tightness and tenderness from the occiput down the bilateral neck.  Full range of motion with no signs of meningismus.   Cardiovascular: Normal heart rate, Normal rhythm, No murmurs, equal pulses.   Pulmonary: Normal breath sounds, No respiratory distress, No wheezing, No rales, No rhonchi.  Abdomen: Soft, No tenderness, No masses, no rebound, no guarding.   Back: No CVA tenderness.   Musculoskeletal: No major deformities noted, No tenderness.   Skin: Warm, Dry, No erythema, No rash.   Neurologic: Alert & oriented x 3, Normal motor function,  No focal deficits noted. 5/5 strength to the bilateral upper and lower extremities.   Psychiatric: Affect normal, Judgment normal, Mood normal.           COURSE & MEDICAL DECISION MAKING  Pertinent Labs & Imaging studies reviewed. (See chart for details)    6:30 PM Obtained and reviewed past medical records which indicate the patient was seen and evaluated yesterday. She was treated with Decadron, Ativan, Zofran and IV fluids. Her labs from yesterday indicated a positive pregnancy test.     6:36 PM - Patient seen and examined at bedside. She " understands the her diagnostic options are limited secondary to her pregnancy. Patient will be treated with tylenol 650 mg, reglan 10 mg and benadryl 25 mg.     7:48 PM Patient reevaluated at bedside. Her headache is improved from a 10/10 to an 8/10.     8:17 PM Patient reevaluated at bedside. Patient reports persistent headache that is unchanged in severity.  Discussed medication options extensively with the patient and she agrees to being treated with a class C medication after discussion of associated risks. Ordered norco 5-325 mg.    8:53 PM Patient reevaluated at bedside. Patient is resting comfortably and confirms improvement of her headache. She agrees to discharge home. Patient understands that she should return with fever or confusion. Encouraged supportive care at home including tylenol, massage and rest.       Medical Decision Making: I think the patient most likely has an occipital headache most likely tension in nature. This came on gradually 7 days ago, gone several times. She has pain is reproducible with palpation over the paraspinal muscles of the neck and occipital region I think this is likely causing her pain. She is not having fever she has full range of motion the neck no signs of meningismus or meningitis. Pain was not thunderclap in nature nature not the worse headache of her life I do not think this is a subarachnoid hemorrhage. Patient understands the Norco is a class C medication but states she needs something strong for her headache.      I reviewed prescription monitoring program for patient's narcotic use before prescribing a scheduled drug.The patient will not drink alcohol nor drive with prescribed medications. The patient will return for new or worsening symptoms and is stable at the time of discharge.      DISPOSITION:  Patient will be discharged home in stable condition.      FOLLOW UP:  Your Physician  Varies    Schedule an appointment as soon as possible for a visit in 3  days      [facility]  [address]  [place] [address]  [phone]      If you need a doctor    [address] [address]  [phone]            OUTPATIENT MEDICATIONS:  Discharge Medication List as of 8/14/2017  9:20 PM      START taking these medications    Details   hydrocodone-acetaminophen (NORCO) 5-325 MG Tab per tablet Take 1-2 Tabs by mouth every 6 hours as needed., Disp-12 Tab, R-0, Print Rx Paper      metoclopramide (REGLAN) 10 MG Tab Take 1 Tab by mouth every 8 hours as needed (Nause or headache)., Disp-12 Tab, R-0, Print Rx Paper             FINAL IMPRESSION  1. Occipital headache           Mary LARKIN (Scribbon), am scribing for, and in the presence of, Damián Garcia M.D.  Electronically signed by: Mary Kruse ([place]), 8/14/2017  Damián LARKIN M.D. personally performed the services described in this documentation, as scribed by Mary Kruse in my presence, and it is both accurate and complete.    The note accurately reflects work and decisions made by me.  Damián Garcia  8/15/2017  1:43 AM

## 2017-08-24 ENCOUNTER — APPOINTMENT (OUTPATIENT)
Dept: RADIOLOGY | Facility: MEDICAL CENTER | Age: 37
End: 2017-08-24
Attending: EMERGENCY MEDICINE
Payer: COMMERCIAL

## 2017-08-24 ENCOUNTER — HOSPITAL ENCOUNTER (EMERGENCY)
Facility: MEDICAL CENTER | Age: 37
End: 2017-08-24
Attending: EMERGENCY MEDICINE
Payer: COMMERCIAL

## 2017-08-24 VITALS
HEART RATE: 87 BPM | TEMPERATURE: 98.2 F | SYSTOLIC BLOOD PRESSURE: 112 MMHG | DIASTOLIC BLOOD PRESSURE: 74 MMHG | BODY MASS INDEX: 27.78 KG/M2 | WEIGHT: 162.7 LBS | RESPIRATION RATE: 16 BRPM | OXYGEN SATURATION: 95 % | HEIGHT: 64 IN

## 2017-08-24 DIAGNOSIS — Z3A.01 LESS THAN 8 WEEKS GESTATION OF PREGNANCY: ICD-10-CM

## 2017-08-24 DIAGNOSIS — G43.019 INTRACTABLE MIGRAINE WITHOUT AURA AND WITHOUT STATUS MIGRAINOSUS: ICD-10-CM

## 2017-08-24 PROCEDURE — 96361 HYDRATE IV INFUSION ADD-ON: CPT

## 2017-08-24 PROCEDURE — 99284 EMERGENCY DEPT VISIT MOD MDM: CPT

## 2017-08-24 PROCEDURE — 700105 HCHG RX REV CODE 258: Performed by: EMERGENCY MEDICINE

## 2017-08-24 PROCEDURE — 96374 THER/PROPH/DIAG INJ IV PUSH: CPT

## 2017-08-24 PROCEDURE — 700111 HCHG RX REV CODE 636 W/ 250 OVERRIDE (IP): Performed by: EMERGENCY MEDICINE

## 2017-08-24 PROCEDURE — 96375 TX/PRO/DX INJ NEW DRUG ADDON: CPT

## 2017-08-24 RX ORDER — MORPHINE SULFATE 4 MG/ML
4 INJECTION, SOLUTION INTRAMUSCULAR; INTRAVENOUS ONCE
Status: COMPLETED | OUTPATIENT
Start: 2017-08-24 | End: 2017-08-24

## 2017-08-24 RX ORDER — SODIUM CHLORIDE 9 MG/ML
1000 INJECTION, SOLUTION INTRAVENOUS ONCE
Status: COMPLETED | OUTPATIENT
Start: 2017-08-24 | End: 2017-08-24

## 2017-08-24 RX ORDER — DIPHENHYDRAMINE HYDROCHLORIDE 50 MG/ML
25 INJECTION INTRAMUSCULAR; INTRAVENOUS ONCE
Status: COMPLETED | OUTPATIENT
Start: 2017-08-24 | End: 2017-08-24

## 2017-08-24 RX ORDER — METOCLOPRAMIDE HYDROCHLORIDE 5 MG/ML
10 INJECTION INTRAMUSCULAR; INTRAVENOUS ONCE
Status: COMPLETED | OUTPATIENT
Start: 2017-08-24 | End: 2017-08-24

## 2017-08-24 RX ORDER — HYDROCODONE BITARTRATE AND ACETAMINOPHEN 5; 325 MG/1; MG/1
1-2 TABLET ORAL EVERY 6 HOURS PRN
Status: SHIPPED | COMMUNITY
End: 2017-09-26

## 2017-08-24 RX ORDER — OXYCODONE HYDROCHLORIDE AND ACETAMINOPHEN 5; 325 MG/1; MG/1
1-2 TABLET ORAL EVERY 6 HOURS PRN
Qty: 10 TAB | Refills: 0 | Status: SHIPPED | OUTPATIENT
Start: 2017-08-24 | End: 2017-09-26

## 2017-08-24 RX ADMIN — SODIUM CHLORIDE 1000 ML: 9 INJECTION, SOLUTION INTRAVENOUS at 13:56

## 2017-08-24 RX ADMIN — DIPHENHYDRAMINE HYDROCHLORIDE 25 MG: 50 INJECTION, SOLUTION INTRAMUSCULAR; INTRAVENOUS at 13:56

## 2017-08-24 RX ADMIN — MORPHINE SULFATE 4 MG: 4 INJECTION INTRAVENOUS at 14:59

## 2017-08-24 RX ADMIN — METOCLOPRAMIDE 10 MG: 5 INJECTION, SOLUTION INTRAMUSCULAR; INTRAVENOUS at 13:57

## 2017-08-24 ASSESSMENT — PAIN SCALES - GENERAL: PAINLEVEL_OUTOF10: 10

## 2017-08-24 NOTE — ED NOTES
Dc instructions and prescriptions given. Pt to f/u neurology, return for worsening s/s. Instructed to NOT take both meds together.

## 2017-08-24 NOTE — ED AVS SNAPSHOT
8/24/2017    Karolyn Hurley  [address] [place] [address]    Dear Karolyn:    [facility] wants to ensure your discharge home is safe and you or your loved ones have had all of your questions answered regarding your care after you leave the hospital.    Below is a list of resources and contact information should you have any questions regarding your hospital stay, follow-up instructions, or active medical symptoms.    Questions or Concerns Regarding… Contact   Medical Questions Related to Your Discharge  (7 days a week, 8am-5pm) Contact a Nurse Care Coordinator   [phone]   Medical Questions Not Related to Your Discharge  (24 hours a day / 7 days a week)  Contact the Nurse Health Line   [phone]    Medications or Discharge Instructions Refer to your discharge packet   or contact your [facility] Primary Care Provider   [phone]   Follow-up Appointment(s) Schedule your appointment via [employer]   or contact Scheduling [phone]   Billing Review your statement via [employer]  or contact Billing [phone]   Medical Records Review your records via [employer]   or contact Medical Records [phone]     You may receive a telephone call within two days of discharge. This call is to make certain you understand your discharge instructions and have the opportunity to have any questions answered. You can also easily access your medical information, test results and upcoming appointments via the [employer] free online health management tool. You can learn more and sign up at [employer]. For assistance setting up your [employer] account, please call [phone].    Once again, we want to ensure your discharge home is safe and that you have a clear understanding of any next steps in your care. If you have any questions or concerns, please do not hesitate to contact us, we are here for you. Thank you for choosing [facility] for your healthcare needs.    Sincerely,    Your [facility] Healthcare Team

## 2017-08-24 NOTE — ED NOTES
"Received call from MRI stating pt \"is scared,\" and \"cannot lie flat, feels nauseated,\"  ERP aware.  MRI will bring pt back to room.  "

## 2017-08-24 NOTE — ED AVS SNAPSHOT
Home Care Instructions                                                                                                                Karolyn Hurley   MRN: [id number]    Department:  [facility], Emergency Dept   Date of Visit:  8/24/2017            [facility], Emergency Dept    [address] [place] [address]    Phone:  [phone]      You were seen by     Edison Fulton M.D.      Your Diagnosis Was     Intractable migraine without aura and without status migrainosus     G43.019       These are the medications you received during your hospitalization from 08/24/2017 1247 to 08/24/2017 1615     Date/Time Order Dose Route Action    08/24/2017 1356 NS infusion 1,000 mL 1,000 mL Intravenous New Bag    08/24/2017 1357 metoclopramide (REGLAN) injection 10 mg 10 mg Intravenous Given    08/24/2017 1356 diphenhydrAMINE (BENADRYL) injection 25 mg 25 mg Intravenous Given    08/24/2017 1459 morphine (pf) 4 mg/ml injection 4 mg 4 mg Intravenous Given      Follow-up Information     1. Follow up with Susan Beck M.D.. Schedule an appointment as soon as possible for a visit in 1 week.    Specialty:  Neurology    Why:  As needed    Contact information    [address]   [place] [address] [phone]        Medication Information     Review all of your home medications and newly ordered medications with your primary doctor and/or pharmacist as soon as possible. Follow medication instructions as directed by your doctor and/or pharmacist.     Please keep your complete medication list with you and share with your physician. Update the information when medications are discontinued, doses are changed, or new medications (including over-the-counter products) are added; and carry medication information at all times in the event of emergency situations.               Medication List      START taking these medications        Instructions    Morning Afternoon Evening  Bedtime    doxylamine 25 MG Tabs tablet   Commonly known as:  UNISOM        Take 1 Tab by mouth every 12 hours as needed (nausea and vomiting).   Dose:  25 mg                        oxycodone-acetaminophen 5-325 MG Tabs   Commonly known as:  PERCOCET        Take 1-2 Tabs by mouth every 6 hours as needed (moderate to severe pain).   Dose:  1-2 Tab                          ASK your doctor about these medications        Instructions    Morning Afternoon Evening Bedtime    hydrocodone-acetaminophen 5-325 MG Tabs per tablet   Commonly known as:  NORCO        Take 1-2 Tabs by mouth every 6 hours as needed.   Dose:  1-2 Tab                        metoclopramide 10 MG Tabs   Commonly known as:  REGLAN        Take 1 Tab by mouth every 8 hours as needed (Nause or headache).   Dose:  10 mg                        TYLENOL PM EXTRA STRENGTH  MG Tabs   Generic drug:  Diphenhydramine-APAP (sleep)        Take 1 Tab by mouth every bedtime.   Dose:  1 Tab                             Where to Get Your Medications      You can get these medications from any pharmacy     Bring a paper prescription for each of these medications    - doxylamine 25 MG Tabs tablet  - oxycodone-acetaminophen 5-325 MG Tabs              Discharge Instructions       Migraine Headache    Take Percocet for headache and doxylamine for insomnia but do not mix these medicines. If not improving next week follow up with neurology..  Return for sudden onset, severe headache, headache and fever or headache and weakness.    You have a migraine headache. Symptoms of migraines include a throbbing headache, made worse by activity. Sometimes only one side of the head hurts. Nausea, vomiting and sinus pain or stuffiness is common with migraines. Visual symptoms such as light sensitivity, blind spots, or flashing lights may also occur. Loud noises may make migraine pain worse. Migraine headaches are caused by changes in the size of the blood vessels in the head and neck.  Many factors may cause this problem including:  Ø Emotional stress, lack of sleep, and menstrual periods.  Ø Alcohol and some drugs (such as birth control pills).  Ø Diet factors (fasting, caffeine, food preservatives, chocolate).  Ø Environmental factors (weather changes, bright lights, odors, smoke).  Ø Jarring motions and loud noises may also bring on a migraine. Prevention of migraines includes dealing with the trigger factors.    Treatment may require medicines for pain, inflammation, and vomiting. Injections for pain and IV fluids can be used if the headache is very severe, or if you are vomiting repeatedly. Get plenty of rest over the next 24 hours.  Lie down in a quiet, dark place and try to sleep  Medicines to prevent the blood vessel changes may be prescribed.  For people with frequent migraines, other medicines such as beta blockers and antidepressants may be helpful. Please see your caregiver if you are not better in 1-2 days.     seek immediate medical care if:  Ø You develop a high fever, repeated vomiting, dehydration, or extreme weakness  Ø You develop fainting, worsening headache, confusion, or seizures  Ø You develop numbness or weakness of the limbs    ExitCare® Patient Information ©2007 [employer].              Patient Information     Patient Information    Following emergency treatment: all patient requiring follow-up care must return either to a private physician or a clinic if your condition worsens before you are able to obtain further medical attention, please return to the emergency room.     Billing Information    At [facility], we work to make the billing process streamlined for our patients.  Our Representatives are here to answer any questions you may have regarding your hospital bill.  If you have insurance coverage and have supplied your insurance information to us, we will submit a claim to your insurer on your behalf.  Should you have any questions regarding your bill, we can  be reached online or by phone as follows:  Online: You are able pay your bills online or live chat with our representatives about any billing questions you may have. We are here to help Monday - Friday from 8:00am to 7:30pm and 9:00am - 12:00pm on Saturdays.  Please visit [email or web address]  for more information.   Phone:  [phone] or [phone]    Please note that your emergency physician, surgeon, pathologist, radiologist, anesthesiologist, and other specialists are not employed by [facility] and will therefore bill separately for their services.  Please contact them directly for any questions concerning their bills at the numbers below:     Emergency Physician Services:  [phone]  [facility]:  [phone]  [facility]:  [phone]  [facility]:  [phone]    1. Your final bill may vary from the amount quoted upon discharge if all procedures are not complete at that time, or if your doctor has additional procedures of which we are not aware. You will receive an additional bill if you return to the Emergency Department at [facility] for suture removal regardless of the facility of which the sutures were placed.     2. Please arrange for settlement of this account at the emergency registration.    3. All self-pay accounts are due in full at the time of treatment.  If you are unable to meet this obligation then payment is expected within 4-5 days.     4. If you have had radiology studies (CT, X-ray, Ultrasound, MRI), you have received a preliminary result during your emergency department visit. Please contact the radiology department [phone] to receive a copy of your final result. Please discuss the Final result with your primary physician or with the follow up physician provided.     Crisis Hotline:  [place] Crisis Hotline:  2-756-UJSIDCO or 1-212.731.3594  [place] Crisis Hotline:    1-757.468.3845 or  171.856.4531         ED Discharge Follow Up Questions    1. In order to provide you with very good care, we would like to follow up with a phone call in the next few days.  May we have your permission to contact you?     YES /  NO    2. What is the best phone number to call you? (       )_____-__________    3. What is the best time to call you?      Morning  /  Afternoon  /  Evening                   Patient Signature:  ____________________________________________________________    Date:  ____________________________________________________________      Your appointments     Sep 26, 2017  9:40 AM   New Patient with Carmelita Covington M.D.   [address] ([facility])    [address] [place] [id number] [phone]           Please bring Photo ID, Insurance Cards, All Medication Bottles and copies of any legal documents (such as Living Will, Power of ) If speaking a language besides English please bring an adult . Please arrive 30 minutes prior for check in and registration. You will be receiving a confirmation call a few days before your appointment from our automated call confirmation system.

## 2017-08-24 NOTE — ED AVS SNAPSHOT
[employer] Access Code: Activation code not generated  Current [employer]S Status: Active    [employer]  A secure, online tool to manage your health information     [employer]® is a secure, online tool that connects you to your personalized health information from the privacy of your home -- day or night - making it very easy for you to manage your healthcare. Once the activation process is completed, you can even access your medical information using the [employer] mary, which is available for free in the Apple Mary store or Google Play store.     R[employer]S provides the following levels of access (as shown below):   My Chart Features   [facility] Primary Care Doctor [facility]  Specialists [facility]  Urgent  Care Non-[facility]  Primary Care  Doctor   Email your healthcare team securely and privately 24/7 X X X X   Manage appointments: schedule your next appointment; view details of past/upcoming appointments X      Request prescription refills. X      View recent personal medical records, including lab and immunizations X X X X   View health record, including health history, allergies, medications X X X X   Read reports about your outpatient visits, procedures, consult and ER notes X X X X   See your discharge summary, which is a recap of your hospital and/or ER visit that includes your diagnosis, lab results, and care plan. X X       How to register for [employer]:  1. Go to  [email or web address].  2. Click on the Sign Up Now box, which takes you to the New Member Sign Up page. You will need to provide the following information:  a. Enter your [employer] Access Code exactly as it appears at the top of this page. (You will not need to use this code after you’ve completed the sign-up process. If you do not sign up before the expiration date, you must request a new code.)   b. Enter your date of birth.   c. Enter your home email address.   d. Click Submit, and follow the next screen’s instructions.  3. Create a [employer] ID. This will  be your [employer] login ID and cannot be changed, so think of one that is secure and easy to remember.  4. Create a [employer] password. You can change your password at any time.  5. Enter your Password Reset Question and Answer. This can be used at a later time if you forget your password.   6. Enter your e-mail address. This allows you to receive e-mail notifications when new information is available in [employer].  7. Click Sign Up. You can now view your health information.    For assistance activating your [employer] account, call [phone]

## 2017-08-24 NOTE — DISCHARGE INSTRUCTIONS
Migraine Headache    Take Percocet for headache and doxylamine for insomnia but do not mix these medicines. If not improving next week follow up with neurology..  Return for sudden onset, severe headache, headache and fever or headache and weakness.    You have a migraine headache. Symptoms of migraines include a throbbing headache, made worse by activity. Sometimes only one side of the head hurts. Nausea, vomiting and sinus pain or stuffiness is common with migraines. Visual symptoms such as light sensitivity, blind spots, or flashing lights may also occur. Loud noises may make migraine pain worse. Migraine headaches are caused by changes in the size of the blood vessels in the head and neck. Many factors may cause this problem including:  Ø Emotional stress, lack of sleep, and menstrual periods.  Ø Alcohol and some drugs (such as birth control pills).  Ø Diet factors (fasting, caffeine, food preservatives, chocolate).  Ø Environmental factors (weather changes, bright lights, odors, smoke).  Ø Jarring motions and loud noises may also bring on a migraine. Prevention of migraines includes dealing with the trigger factors.    Treatment may require medicines for pain, inflammation, and vomiting. Injections for pain and IV fluids can be used if the headache is very severe, or if you are vomiting repeatedly. Get plenty of rest over the next 24 hours.  Lie down in a quiet, dark place and try to sleep  Medicines to prevent the blood vessel changes may be prescribed.  For people with frequent migraines, other medicines such as beta blockers and antidepressants may be helpful. Please see your caregiver if you are not better in 1-2 days.     seek immediate medical care if:  Ø You develop a high fever, repeated vomiting, dehydration, or extreme weakness  Ø You develop fainting, worsening headache, confusion, or seizures  Ø You develop numbness or weakness of the limbs    [employer] Ideas[employer]® Patient Information ©2007 [employer].

## 2017-08-24 NOTE — ED NOTES
Migraine and chills for 3 weeks, memory loss and no sleep, pt is pregnant but does not know how far along as she has not seen an OB. She is a poor historian, but states she was hospitalized for this same thing, 1 1/2 weeks ago.

## 2017-08-24 NOTE — ED PROVIDER NOTES
"ED Provider Note    CHIEF COMPLAINT  Chief Complaint   Patient presents with   • Headache     3 weeks       HPI  Karolyn Hurley is a 36 y.o. female who presents for severe generalized throbbing headache associated with intermittent nausea vomiting and photophobia for the last 3 weeks. This is her 3rd ER visit. She is pregnant a  with a last menstrual period around . She's never had an imaging study or diagnosis of migraines. She's never had a headache like this. She does not experience an increase in headache with pregnancy. No history of DVT or PE. No fever tooth pain and jaw pain weakness or numbness. She's had transient benefit with treatment here in the ED bed has been sent home with no analgesics. No prenatal vitamin use.    REVIEW OF SYSTEMS  Pertinent positives include: Headache, nausea, vomiting, photophobia, pregnancy.  Pertinent negatives include: Fever, tooth pain, weakness, numbness, facial droop, leg swelling, bleeding, abdominal pain.  10+ systems reviewed and negative.     PAST MEDICAL HISTORY  No past medical history on file.    FAMILY HISTORY  No history of migraine nor aneurysm    SOCIAL HISTORY  Social History     Social History   • Marital Status:      Spouse Name: N/A   • Number of Children: N/A   • Years of Education: N/A     Social History Main Topics   • Smoking status: Never Smoker    • Smokeless tobacco: Not on file   • Alcohol Use: No   • Drug Use: No   • Sexual Activity: Not on file     Other Topics Concern   • Not on file     Social History Narrative       CURRENT MEDICATIONS  None.    ALLERGIES  Allergies   Allergen Reactions   • Nkda [No Known Drug Allergy]        PHYSICAL EXAM  VITAL SIGNS: /74 mmHg  Pulse 106  Temp(Src) 36.8 °C (98.2 °F)  Resp 16  Ht 1.626 m (5' 4.02\")  Wt 73.8 kg (162 lb 11.2 oz)  BMI 27.91 kg/m2  LMP 2017 (Approximate)  Constitutional:  Well developed, Well nourished,Tachycardic, appears to be in pain.   HENT: " Atraumatic face symmetric oropharynx moist, no TMJ crepitus or tooth tenderness. No erythema or exudate. No meningismus  Eyes: PERRLA, EOMI, Conjunctiva normal, No discharge.   Neck: Normal range of motion, No tenderness, Supple.   Lymphatic: No lymphadenopathy noted.   Cardiovascular: Regular, S1-S2, No murmurs, No rubs, No gallops.   Respiratory: Normal breath sounds, No respiratory distress, No wheezing, No chest tenderness.   Skin: Warm, Dry, No erythema, No rash.   Extremities:, No edema or deformity, No tenderness.   Psychiatric: Affect normal, Judgment normal, Mood normal.   Neurologic: Cranial nerves II-XII are intact, Grasp, biceps, extensor hallucis longus, ankle plantar flexion are 5/5 and symmetric, Sensation is intact to light touch in all 4 limbs finger-nose-finger and fine motor without dysmetria.  No focal deficits noted.    LABORATORY: Old chart reviewed as below  Results for orders placed or performed during the hospital encounter of 08/13/17   BETA-HCG QUALITATIVE SERUM   Result Value Ref Range    Beta-Hcg Qualitative Serum Positive (A) Negative       INTERVENTIONS:  Medications   NS infusion 1,000 mL (0 mL Intravenous Stopped 8/24/17 1615)   metoclopramide (REGLAN) injection 10 mg (10 mg Intravenous Given 8/24/17 1357)   diphenhydrAMINE (BENADRYL) injection 25 mg (25 mg Intravenous Given 8/24/17 1356)   morphine (pf) 4 mg/ml injection 4 mg (4 mg Intravenous Given 8/24/17 1459)     Response:Improvement in pain to 5 of 10.    COURSE & MEDICAL DECISION MAKING;  This patient presents with a three-week moderately severe headache with migraine features that has not been responsive to 2 prior ED visits and a trial of hydrocodone. The patient attributes her headache to insomnia. It is probable this is a migraine related to her pregnancy. I was concerned the patient could have venous sinus thrombosis and ordered MRI brain but the patient had anxiety in the scanner and refused the study. At this point  there is no evidence of meningitis, subarachnoid hemorrhage, carotid dissection, pseudotumor cerebri or brain mass..    PLAN:  Trial of Percocet 5 mg #10 for headache  Unisom for insomnia  Prescription monitoring access  Follow-up neurology Dr. Beck if headaches persist one week  Migraine handout    CONDITION: Stable, improved.    FINAL IMPRESSION:  1. Intractable migraine without aura and without status migrainosus    2. Less than 8 weeks gestation of pregnancy          Electronically signed by: Edison Fulton, 8/24/2017 1:38 PM

## 2017-08-24 NOTE — ED NOTES
Med Rec completed per patient and family at bedside  Allergies reviewed  No ORAL antibiotics in last 30 days

## 2017-09-19 ENCOUNTER — TELEPHONE (OUTPATIENT)
Dept: MEDICAL GROUP | Age: 37
End: 2017-09-19

## 2017-09-26 ENCOUNTER — OFFICE VISIT (OUTPATIENT)
Dept: MEDICAL GROUP | Age: 37
End: 2017-09-26
Payer: COMMERCIAL

## 2017-09-26 VITALS
WEIGHT: 163 LBS | TEMPERATURE: 97.7 F | HEART RATE: 100 BPM | BODY MASS INDEX: 27.83 KG/M2 | DIASTOLIC BLOOD PRESSURE: 64 MMHG | SYSTOLIC BLOOD PRESSURE: 122 MMHG | OXYGEN SATURATION: 96 % | HEIGHT: 64 IN

## 2017-09-26 DIAGNOSIS — Z86.32 HISTORY OF GESTATIONAL DIABETES: ICD-10-CM

## 2017-09-26 DIAGNOSIS — K59.01 SLOW TRANSIT CONSTIPATION: ICD-10-CM

## 2017-09-26 DIAGNOSIS — Z00.00 ROUTINE GENERAL MEDICAL EXAMINATION AT A HEALTH CARE FACILITY: ICD-10-CM

## 2017-09-26 DIAGNOSIS — R73.01 IFG (IMPAIRED FASTING GLUCOSE): ICD-10-CM

## 2017-09-26 DIAGNOSIS — Z34.91 FIRST TRIMESTER PREGNANCY: ICD-10-CM

## 2017-09-26 PROBLEM — Z3A.08 8 WEEKS GESTATION OF PREGNANCY: Status: ACTIVE | Noted: 2017-09-26

## 2017-09-26 PROCEDURE — 99385 PREV VISIT NEW AGE 18-39: CPT | Performed by: INTERNAL MEDICINE

## 2017-09-26 RX ORDER — DOCUSATE SODIUM 100 MG/1
100 CAPSULE, LIQUID FILLED ORAL 2 TIMES DAILY
Qty: 60 CAP | Refills: 0 | Status: ON HOLD | OUTPATIENT
Start: 2017-09-26 | End: 2018-03-26

## 2017-09-26 RX ORDER — VITAMIN A ACETATE, BETA CAROTENE, ASCORBIC ACID, CHOLECALCIFEROL, .ALPHA.-TOCOPHEROL ACETATE, DL-, THIAMINE MONONITRATE, RIBOFLAVIN, NIACINAMIDE, PYRIDOXINE HYDROCHLORIDE, FOLIC ACID, CYANOCOBALAMIN, CALCIUM CARBONATE, FERROUS FUMARATE, ZINC OXIDE, CUPRIC OXIDE 3080; 12; 120; 400; 1; 1.84; 3; 20; 22; 920; 25; 200; 27; 10; 2 [IU]/1; UG/1; MG/1; [IU]/1; MG/1; MG/1; MG/1; MG/1; MG/1; [IU]/1; MG/1; MG/1; MG/1; MG/1; MG/1
1 TABLET, FILM COATED ORAL EVERY MORNING
Qty: 30 TAB | Refills: 11 | Status: SHIPPED | OUTPATIENT
Start: 2017-09-26 | End: 2018-12-17

## 2017-09-26 ASSESSMENT — PATIENT HEALTH QUESTIONNAIRE - PHQ9: CLINICAL INTERPRETATION OF PHQ2 SCORE: 0

## 2017-09-26 NOTE — ASSESSMENT & PLAN NOTE
Patient stated that she is not taking any medication and she is generally healthy. She has history of gestational diabetes and last to pregnancy. She reported that she is pregnant for about 2 months. She has not started prenatal vitamins yet.

## 2017-09-26 NOTE — PROGRESS NOTES
Chief Complaint:     Karolyn Hurley is a 37 y.o. female who presents for annual exam    Pt has GYN provider: no  Last colonoscopy: Not indicated yet  Bone density test:N\A  Gardasil:N\A   Last Td: 4/22/14  Regular exercise: no     She  has no past medical history of Asthma; Congestive heart failure (CMS-HCC); Diabetes (CMS-HCC); or Hypertension.  She  has no past surgical history on file.  Current Outpatient Prescriptions   Medication Sig Dispense Refill   • docusate sodium (COLACE) 100 MG Cap Take 1 Cap by mouth 2 times a day. 60 Cap 0   • prenatal plus vitamin (STUARTNATAL 1+1) 27-1 MG Tab tablet Take 1 Tab by mouth every morning. 30 Tab 11     No current facility-administered medications for this visit.      Social History   Substance Use Topics   • Smoking status: Never Smoker   • Smokeless tobacco: Never Used   • Alcohol use No       Review Of Systems  Denies fever, chills, or sweats, unexplained weight changes  Skin: negative for rash, changing moles, abnormal pigmentation, hair or nail changes.  Eyes: negative for visual blurring, double vision, eye pain, floaters and discharge from eyes  Ears/Nose/Throat: negative for tinnitus, vertigo, oral or dental problem, hoarseness, frequent URI's, sinus trouble, persistent sore throat  Respiratory: negative for persistent cough, hemoptysis, dyspnea, wheezing  Cardiovascular: negative for palpitations, tachycardia, irregular heart beat, chest pain or pressure or peripheral edema.  Breast: Denies breast tenderness, mass,  changes in size or contour, or abnormal cyclic discomfort.  Gastrointestinal: negative for dysphagia or odynophagia, nausea, heartburn or reflux, abdominal pain, hemorrhoids, constipation or diarrhea, black stool or bloody stool  Genitourinary: negative for nocturia, dysuria, frequency, incontinence, abnormal vaginal discharge, dysparunia or abnormal vaginal bleeding  Musculoskeletal: negative for joint swelling and muscle pain/  "soreness  Neurologic: negative for new or changing headaches, new weakness tremor  Psychiatric: negative for mood or sleep disturbance, anxiety, depression, sexual difficulties  Hematologic/Lymphatic/Immunologic: negative for pallor, unusual bruising, swollen glands, HIV risk factors  Endocrine: negative for temperature intolerance, polydipsia, polyuria.       PHYSICAL EXAMINATION:  Blood pressure 122/64, pulse 100, temperature 36.5 °C (97.7 °F), height 1.626 m (5' 4.02\"), weight 73.9 kg (163 lb), last menstrual period 07/07/2017, SpO2 96 %, unknown if currently breastfeeding.  Body mass index is 27.96 kg/m².  Wt Readings from Last 4 Encounters:   09/26/17 73.9 kg (163 lb)   08/24/17 73.8 kg (162 lb 11.2 oz)   08/14/17 75 kg (165 lb 5.5 oz)   08/12/17 74.7 kg (164 lb 10.9 oz)       Constitutional: Alert, no distress.  Skin: Warm, dry, good turgor, no rashes or suspicious lesions in visible areas.  Eye: pupils equal, round and reactive to light, conjunctivae clear, lids normal.  ENMT: Lips without lesions, good dentition, oropharynx clear. Pinnae skin normal with no lesions. TM pearly gray with normal light reflex.   Neck: supple, no masses. No anterior or supraclavicular adenopathy. No carotid bruits no thyromegaly.  Respiratory: Unlabored respiratory effort, lungs clear to auscultation, no wheezes, no ronchi.  Cardiovascular: Normal S1, S2, no murmur, no peripheral edema.  Abdomen: no CVAT abdomen Soft, non-tender, no masses, no hepatosplenomegaly.  Psych: Alert and oriented x3, normal affect and mood.  Musc/Skel: 5/5 strength and normal motion UE and LE proximal and distal.        ASSESSMENT/PLAN:  1. Routine general medical examination at a health care facility       2. Slow transit constipation  docusate sodium (COLACE) 100 MG Cap    Advised to increase fiber intake and advised to drink 60 ounces of water a day. She can try docusate one tablet twice a day for one week. I discussed with patient that although " Colace is low risk for pregnancy, I still want her to use it cautiously. Reviewed the potential side effects of Colace with patient. She can take Tums as needed for indigestion. I discussed with her to avoid eating acidic food, spicy food and avoid excessive caffeine use and limit coffee intake.     3. First trimester pregnancy  prenatal plus vitamin (STUARTNATAL 1+1) 27-1 MG Tab tablet    REFERRAL TO GYNECOLOGY    CBC WITH DIFFERENTIAL    COMP METABOLIC PANEL    Advised to start taking prenatal vitamins daily. Refer to gynecologist. Discussed to have enough rest and eat balanced diet for nutrition.     4. History of gestational diabetes  REFERRAL TO GYNECOLOGY    HEMOGLOBIN A1C    Discussed to eat a low carbohydrate diet, high-fiber diet and regular exercise as tolerated.     5. IFG (impaired fasting glucose)  COMP METABOLIC PANEL    HEMOGLOBIN A1C     HCM: Counseling to have flu shot. However, patient wants to postpone it today.  Labs ordered  Immunizations per orders  Pt counseled about skin care, diet, supplements, and exercise.  Next office visit for recheck of chronic medical conditions is due in 3 months

## 2017-09-26 NOTE — ASSESSMENT & PLAN NOTE
Patient reported having gestational diabetes in last two pregnancies. She denies family history of diabetes. She denied complication during delivery and she has normal vaginal delivery for all her pregnancies.

## 2017-09-26 NOTE — ASSESSMENT & PLAN NOTE
She reported having constipation since she has her last child, who is 9 month old. She reported having bowel movement every other day with hard stool and small amount. She needs to push and strain. She reported feeling bloated on the abdomen and feels gassy. She is able to pass gas. She denied blood in stool. Last bowel movement was this morning. She does not have adequately hydration and does not eat fiber enough.

## 2017-09-26 NOTE — ASSESSMENT & PLAN NOTE
Patient reported that her LMP was 7/1/17. She has positive urine pregnancy test in August 2017. She reported having 8 pregnancy currently. She has not started prenatal vitamins yet. She denied complication in previous pregnancy, but she has history of gestational diabetes. She has not seen the gynecologist for this pregnancy yet.

## 2017-09-27 ENCOUNTER — TELEPHONE (OUTPATIENT)
Dept: MEDICAL GROUP | Age: 37
End: 2017-09-27

## 2017-09-27 NOTE — TELEPHONE ENCOUNTER
[facility] #[id number] - [place] - [place] [address]  Phone: [phone] Fax: [phone]      Pharmacy wants to let pcp know that STUARTNATAL is no longer made and would like an alternative.

## 2017-09-28 NOTE — TELEPHONE ENCOUNTER
Phone Number Called: [phone] (home)     Message: called pt notified of message below.     Left Message for patient to call back: no

## 2017-10-04 ENCOUNTER — HOSPITAL ENCOUNTER (OUTPATIENT)
Dept: LAB | Facility: MEDICAL CENTER | Age: 37
End: 2017-10-04
Attending: INTERNAL MEDICINE
Payer: COMMERCIAL

## 2017-10-04 DIAGNOSIS — R73.01 IFG (IMPAIRED FASTING GLUCOSE): ICD-10-CM

## 2017-10-04 DIAGNOSIS — Z86.32 HISTORY OF GESTATIONAL DIABETES: ICD-10-CM

## 2017-10-04 DIAGNOSIS — Z34.91 FIRST TRIMESTER PREGNANCY: ICD-10-CM

## 2017-10-04 LAB
ALBUMIN SERPL BCP-MCNC: 3.5 G/DL (ref 3.2–4.9)
ALBUMIN/GLOB SERPL: 1 G/DL
ALP SERPL-CCNC: 57 U/L (ref 30–99)
ALT SERPL-CCNC: 9 U/L (ref 2–50)
ANION GAP SERPL CALC-SCNC: 8 MMOL/L (ref 0–11.9)
AST SERPL-CCNC: 17 U/L (ref 12–45)
BASOPHILS # BLD AUTO: 0.2 % (ref 0–1.8)
BASOPHILS # BLD: 0.02 K/UL (ref 0–0.12)
BILIRUB SERPL-MCNC: 0.3 MG/DL (ref 0.1–1.5)
BUN SERPL-MCNC: 7 MG/DL (ref 8–22)
CALCIUM SERPL-MCNC: 8.8 MG/DL (ref 8.5–10.5)
CHLORIDE SERPL-SCNC: 105 MMOL/L (ref 96–112)
CO2 SERPL-SCNC: 21 MMOL/L (ref 20–33)
CREAT SERPL-MCNC: 0.51 MG/DL (ref 0.5–1.4)
EOSINOPHIL # BLD AUTO: 0.07 K/UL (ref 0–0.51)
EOSINOPHIL NFR BLD: 0.8 % (ref 0–6.9)
ERYTHROCYTE [DISTWIDTH] IN BLOOD BY AUTOMATED COUNT: 49.7 FL (ref 35.9–50)
EST. AVERAGE GLUCOSE BLD GHB EST-MCNC: 126 MG/DL
GFR SERPL CREATININE-BSD FRML MDRD: >60 ML/MIN/1.73 M 2
GLOBULIN SER CALC-MCNC: 3.4 G/DL (ref 1.9–3.5)
GLUCOSE SERPL-MCNC: 85 MG/DL (ref 65–99)
HBA1C MFR BLD: 6 % (ref 0–5.6)
HCT VFR BLD AUTO: 37.5 % (ref 37–47)
HGB BLD-MCNC: 11.9 G/DL (ref 12–16)
IMM GRANULOCYTES # BLD AUTO: 0.03 K/UL (ref 0–0.11)
IMM GRANULOCYTES NFR BLD AUTO: 0.3 % (ref 0–0.9)
LYMPHOCYTES # BLD AUTO: 1.86 K/UL (ref 1–4.8)
LYMPHOCYTES NFR BLD: 20.6 % (ref 22–41)
MCH RBC QN AUTO: 27.7 PG (ref 27–33)
MCHC RBC AUTO-ENTMCNC: 31.7 G/DL (ref 33.6–35)
MCV RBC AUTO: 87.4 FL (ref 81.4–97.8)
MONOCYTES # BLD AUTO: 0.38 K/UL (ref 0–0.85)
MONOCYTES NFR BLD AUTO: 4.2 % (ref 0–13.4)
NEUTROPHILS # BLD AUTO: 6.68 K/UL (ref 2–7.15)
NEUTROPHILS NFR BLD: 73.9 % (ref 44–72)
NRBC # BLD AUTO: 0 K/UL
NRBC BLD AUTO-RTO: 0 /100 WBC
PLATELET # BLD AUTO: 208 K/UL (ref 164–446)
PMV BLD AUTO: 11.6 FL (ref 9–12.9)
POTASSIUM SERPL-SCNC: 3.9 MMOL/L (ref 3.6–5.5)
PROT SERPL-MCNC: 6.9 G/DL (ref 6–8.2)
RBC # BLD AUTO: 4.29 M/UL (ref 4.2–5.4)
SODIUM SERPL-SCNC: 134 MMOL/L (ref 135–145)
WBC # BLD AUTO: 9 K/UL (ref 4.8–10.8)

## 2017-10-04 PROCEDURE — 85025 COMPLETE CBC W/AUTO DIFF WBC: CPT

## 2017-10-04 PROCEDURE — 80053 COMPREHEN METABOLIC PANEL: CPT

## 2017-10-04 PROCEDURE — 36415 COLL VENOUS BLD VENIPUNCTURE: CPT

## 2017-10-04 PROCEDURE — 83036 HEMOGLOBIN GLYCOSYLATED A1C: CPT

## 2017-12-26 ENCOUNTER — APPOINTMENT (OUTPATIENT)
Dept: MEDICAL GROUP | Age: 37
End: 2017-12-26
Payer: COMMERCIAL

## 2018-03-26 ENCOUNTER — HOSPITAL ENCOUNTER (INPATIENT)
Facility: MEDICAL CENTER | Age: 38
LOS: 1 days | End: 2018-03-27
Attending: OBSTETRICS & GYNECOLOGY | Admitting: OBSTETRICS & GYNECOLOGY
Payer: COMMERCIAL

## 2018-03-26 LAB
BASOPHILS # BLD AUTO: 0.1 % (ref 0–1.8)
BASOPHILS # BLD AUTO: 0.1 % (ref 0–1.8)
BASOPHILS # BLD: 0.01 K/UL (ref 0–0.12)
BASOPHILS # BLD: 0.01 K/UL (ref 0–0.12)
EOSINOPHIL # BLD AUTO: 0.01 K/UL (ref 0–0.51)
EOSINOPHIL # BLD AUTO: 0.03 K/UL (ref 0–0.51)
EOSINOPHIL NFR BLD: 0.1 % (ref 0–6.9)
EOSINOPHIL NFR BLD: 0.3 % (ref 0–6.9)
ERYTHROCYTE [DISTWIDTH] IN BLOOD BY AUTOMATED COUNT: 48.4 FL (ref 35.9–50)
ERYTHROCYTE [DISTWIDTH] IN BLOOD BY AUTOMATED COUNT: 48.4 FL (ref 35.9–50)
HCT VFR BLD AUTO: 31.8 % (ref 37–47)
HCT VFR BLD AUTO: 36.7 % (ref 37–47)
HGB BLD-MCNC: 10.6 G/DL (ref 12–16)
HGB BLD-MCNC: 12.1 G/DL (ref 12–16)
HOLDING TUBE BB 8507: NORMAL
IMM GRANULOCYTES # BLD AUTO: 0.05 K/UL (ref 0–0.11)
IMM GRANULOCYTES # BLD AUTO: 0.05 K/UL (ref 0–0.11)
IMM GRANULOCYTES NFR BLD AUTO: 0.4 % (ref 0–0.9)
IMM GRANULOCYTES NFR BLD AUTO: 0.5 % (ref 0–0.9)
LYMPHOCYTES # BLD AUTO: 1.33 K/UL (ref 1–4.8)
LYMPHOCYTES # BLD AUTO: 1.97 K/UL (ref 1–4.8)
LYMPHOCYTES NFR BLD: 19.6 % (ref 22–41)
LYMPHOCYTES NFR BLD: 9.6 % (ref 22–41)
MCH RBC QN AUTO: 28 PG (ref 27–33)
MCH RBC QN AUTO: 28.4 PG (ref 27–33)
MCHC RBC AUTO-ENTMCNC: 33 G/DL (ref 33.6–35)
MCHC RBC AUTO-ENTMCNC: 33.3 G/DL (ref 33.6–35)
MCV RBC AUTO: 85 FL (ref 81.4–97.8)
MCV RBC AUTO: 85.3 FL (ref 81.4–97.8)
MONOCYTES # BLD AUTO: 0.57 K/UL (ref 0–0.85)
MONOCYTES # BLD AUTO: 0.6 K/UL (ref 0–0.85)
MONOCYTES NFR BLD AUTO: 4.3 % (ref 0–13.4)
MONOCYTES NFR BLD AUTO: 5.7 % (ref 0–13.4)
NEUTROPHILS # BLD AUTO: 11.84 K/UL (ref 2–7.15)
NEUTROPHILS # BLD AUTO: 7.4 K/UL (ref 2–7.15)
NEUTROPHILS NFR BLD: 73.8 % (ref 44–72)
NEUTROPHILS NFR BLD: 85.5 % (ref 44–72)
NRBC # BLD AUTO: 0 K/UL
NRBC # BLD AUTO: 0 K/UL
NRBC BLD-RTO: 0 /100 WBC
NRBC BLD-RTO: 0 /100 WBC
PLATELET # BLD AUTO: 161 K/UL (ref 164–446)
PLATELET # BLD AUTO: 178 K/UL (ref 164–446)
PMV BLD AUTO: 11.3 FL (ref 9–12.9)
PMV BLD AUTO: 11.4 FL (ref 9–12.9)
RBC # BLD AUTO: 3.73 M/UL (ref 4.2–5.4)
RBC # BLD AUTO: 4.32 M/UL (ref 4.2–5.4)
WBC # BLD AUTO: 10 K/UL (ref 4.8–10.8)
WBC # BLD AUTO: 13.8 K/UL (ref 4.8–10.8)

## 2018-03-26 PROCEDURE — 700102 HCHG RX REV CODE 250 W/ 637 OVERRIDE(OP): Performed by: OBSTETRICS & GYNECOLOGY

## 2018-03-26 PROCEDURE — 700111 HCHG RX REV CODE 636 W/ 250 OVERRIDE (IP): Performed by: OBSTETRICS & GYNECOLOGY

## 2018-03-26 PROCEDURE — 90686 IIV4 VACC NO PRSV 0.5 ML IM: CPT | Performed by: OBSTETRICS & GYNECOLOGY

## 2018-03-26 PROCEDURE — 700101 HCHG RX REV CODE 250

## 2018-03-26 PROCEDURE — 304965 HCHG RECOVERY SERVICES

## 2018-03-26 PROCEDURE — 85025 COMPLETE CBC W/AUTO DIFF WBC: CPT

## 2018-03-26 PROCEDURE — 10907ZC DRAINAGE OF AMNIOTIC FLUID, THERAPEUTIC FROM PRODUCTS OF CONCEPTION, VIA NATURAL OR ARTIFICIAL OPENING: ICD-10-PCS | Performed by: OBSTETRICS & GYNECOLOGY

## 2018-03-26 PROCEDURE — 700111 HCHG RX REV CODE 636 W/ 250 OVERRIDE (IP)

## 2018-03-26 PROCEDURE — 36415 COLL VENOUS BLD VENIPUNCTURE: CPT

## 2018-03-26 PROCEDURE — 700105 HCHG RX REV CODE 258: Performed by: OBSTETRICS & GYNECOLOGY

## 2018-03-26 PROCEDURE — 700105 HCHG RX REV CODE 258

## 2018-03-26 PROCEDURE — A9270 NON-COVERED ITEM OR SERVICE: HCPCS | Performed by: OBSTETRICS & GYNECOLOGY

## 2018-03-26 PROCEDURE — 59409 OBSTETRICAL CARE: CPT

## 2018-03-26 PROCEDURE — 303615 HCHG EPIDURAL/SPINAL ANESTHESIA FOR LABOR

## 2018-03-26 PROCEDURE — 770002 HCHG ROOM/CARE - OB PRIVATE (112)

## 2018-03-26 PROCEDURE — 90471 IMMUNIZATION ADMIN: CPT

## 2018-03-26 RX ORDER — SODIUM CHLORIDE, SODIUM LACTATE, POTASSIUM CHLORIDE, CALCIUM CHLORIDE 600; 310; 30; 20 MG/100ML; MG/100ML; MG/100ML; MG/100ML
INJECTION, SOLUTION INTRAVENOUS CONTINUOUS
Status: DISPENSED | OUTPATIENT
Start: 2018-03-26 | End: 2018-03-26

## 2018-03-26 RX ORDER — IBUPROFEN 600 MG/1
600 TABLET ORAL EVERY 6 HOURS PRN
Status: DISCONTINUED | OUTPATIENT
Start: 2018-03-26 | End: 2018-03-27 | Stop reason: HOSPADM

## 2018-03-26 RX ORDER — SODIUM CHLORIDE, SODIUM LACTATE, POTASSIUM CHLORIDE, CALCIUM CHLORIDE 600; 310; 30; 20 MG/100ML; MG/100ML; MG/100ML; MG/100ML
INJECTION, SOLUTION INTRAVENOUS
Status: COMPLETED
Start: 2018-03-26 | End: 2018-03-26

## 2018-03-26 RX ORDER — ONDANSETRON 2 MG/ML
INJECTION INTRAMUSCULAR; INTRAVENOUS
Status: COMPLETED
Start: 2018-03-26 | End: 2018-03-26

## 2018-03-26 RX ORDER — ALUMINA, MAGNESIA, AND SIMETHICONE 2400; 2400; 240 MG/30ML; MG/30ML; MG/30ML
30 SUSPENSION ORAL 4 TIMES DAILY PRN
Status: DISCONTINUED | OUTPATIENT
Start: 2018-03-26 | End: 2018-03-27 | Stop reason: HOSPADM

## 2018-03-26 RX ORDER — OXYCODONE HYDROCHLORIDE AND ACETAMINOPHEN 5; 325 MG/1; MG/1
2 TABLET ORAL EVERY 4 HOURS PRN
Status: DISCONTINUED | OUTPATIENT
Start: 2018-03-26 | End: 2018-03-27 | Stop reason: HOSPADM

## 2018-03-26 RX ORDER — ONDANSETRON 2 MG/ML
4 INJECTION INTRAMUSCULAR; INTRAVENOUS EVERY 6 HOURS PRN
Status: DISCONTINUED | OUTPATIENT
Start: 2018-03-26 | End: 2018-03-27 | Stop reason: HOSPADM

## 2018-03-26 RX ORDER — OXYCODONE HYDROCHLORIDE AND ACETAMINOPHEN 5; 325 MG/1; MG/1
1 TABLET ORAL EVERY 4 HOURS PRN
Status: DISCONTINUED | OUTPATIENT
Start: 2018-03-26 | End: 2018-03-27 | Stop reason: HOSPADM

## 2018-03-26 RX ORDER — SODIUM CHLORIDE, SODIUM LACTATE, POTASSIUM CHLORIDE, CALCIUM CHLORIDE 600; 310; 30; 20 MG/100ML; MG/100ML; MG/100ML; MG/100ML
INJECTION, SOLUTION INTRAVENOUS PRN
Status: DISCONTINUED | OUTPATIENT
Start: 2018-03-26 | End: 2018-03-27 | Stop reason: HOSPADM

## 2018-03-26 RX ORDER — MISOPROSTOL 200 UG/1
TABLET ORAL
Status: ACTIVE
Start: 2018-03-26 | End: 2018-03-26

## 2018-03-26 RX ORDER — VITAMIN A ACETATE, BETA CAROTENE, ASCORBIC ACID, CHOLECALCIFEROL, .ALPHA.-TOCOPHEROL ACETATE, DL-, THIAMINE MONONITRATE, RIBOFLAVIN, NIACINAMIDE, PYRIDOXINE HYDROCHLORIDE, FOLIC ACID, CYANOCOBALAMIN, CALCIUM CARBONATE, FERROUS FUMARATE, ZINC OXIDE, CUPRIC OXIDE 3080; 12; 120; 400; 1; 1.84; 3; 20; 22; 920; 25; 200; 27; 10; 2 [IU]/1; UG/1; MG/1; [IU]/1; MG/1; MG/1; MG/1; MG/1; MG/1; [IU]/1; MG/1; MG/1; MG/1; MG/1; MG/1
1 TABLET, FILM COATED ORAL EVERY MORNING
Status: DISCONTINUED | OUTPATIENT
Start: 2018-03-26 | End: 2018-03-27 | Stop reason: HOSPADM

## 2018-03-26 RX ORDER — ONDANSETRON 4 MG/1
4 TABLET, ORALLY DISINTEGRATING ORAL EVERY 6 HOURS PRN
Status: DISCONTINUED | OUTPATIENT
Start: 2018-03-26 | End: 2018-03-27 | Stop reason: HOSPADM

## 2018-03-26 RX ORDER — METHYLERGONOVINE MALEATE 0.2 MG/ML
INJECTION INTRAVENOUS
Status: COMPLETED
Start: 2018-03-26 | End: 2018-03-26

## 2018-03-26 RX ORDER — MISOPROSTOL 200 UG/1
800 TABLET ORAL
Status: DISCONTINUED | OUTPATIENT
Start: 2018-03-26 | End: 2018-03-27 | Stop reason: HOSPADM

## 2018-03-26 RX ORDER — ROPIVACAINE HYDROCHLORIDE 2 MG/ML
INJECTION, SOLUTION EPIDURAL; INFILTRATION; PERINEURAL
Status: COMPLETED
Start: 2018-03-26 | End: 2018-03-26

## 2018-03-26 RX ORDER — MISOPROSTOL 200 UG/1
800 TABLET ORAL
Status: COMPLETED | OUTPATIENT
Start: 2018-03-26 | End: 2018-03-26

## 2018-03-26 RX ADMIN — ROPIVACAINE HYDROCHLORIDE 100 ML: 2 INJECTION, SOLUTION EPIDURAL; INFILTRATION at 06:20

## 2018-03-26 RX ADMIN — Medication 125 ML/HR: at 12:01

## 2018-03-26 RX ADMIN — SODIUM CHLORIDE, POTASSIUM CHLORIDE, SODIUM LACTATE AND CALCIUM CHLORIDE 1000 ML: 600; 310; 30; 20 INJECTION, SOLUTION INTRAVENOUS at 06:05

## 2018-03-26 RX ADMIN — MISOPROSTOL 800 MCG: 200 TABLET ORAL at 11:51

## 2018-03-26 RX ADMIN — ALUMINUM HYDROXIDE, MAGNESIUM HYDROXIDE,SIMETHICONE 30 ML: 400; 400; 40 LIQUID ORAL at 08:33

## 2018-03-26 RX ADMIN — IBUPROFEN 600 MG: 600 TABLET, FILM COATED ORAL at 12:00

## 2018-03-26 RX ADMIN — SODIUM CHLORIDE, POTASSIUM CHLORIDE, SODIUM LACTATE AND CALCIUM CHLORIDE 1000 ML: 600; 310; 30; 20 INJECTION, SOLUTION INTRAVENOUS at 07:35

## 2018-03-26 RX ADMIN — SODIUM CHLORIDE, POTASSIUM CHLORIDE, SODIUM LACTATE AND CALCIUM CHLORIDE 1000 ML: 600; 310; 30; 20 INJECTION, SOLUTION INTRAVENOUS at 05:30

## 2018-03-26 RX ADMIN — METHYLERGONOVINE MALEATE 0.2 MG: 0.2 INJECTION, SOLUTION INTRAMUSCULAR; INTRAVENOUS at 13:34

## 2018-03-26 RX ADMIN — INFLUENZA A VIRUS A/MICHIGAN/45/2015 X-275 (H1N1) ANTIGEN (FORMALDEHYDE INACTIVATED), INFLUENZA A VIRUS A/HONG KONG/4801/2014 X-263B (H3N2) ANTIGEN (FORMALDEHYDE INACTIVATED), INFLUENZA B VIRUS B/PHUKET/3073/2013 ANTIGEN (FORMALDEHYDE INACTIVATED), AND INFLUENZA B VIRUS B/BRISBANE/60/2008 ANTIGEN (FORMALDEHYDE INACTIVATED) 0.5 ML: 15; 15; 15; 15 INJECTION, SUSPENSION INTRAMUSCULAR at 18:36

## 2018-03-26 RX ADMIN — SODIUM CHLORIDE, POTASSIUM CHLORIDE, SODIUM LACTATE AND CALCIUM CHLORIDE: 600; 310; 30; 20 INJECTION, SOLUTION INTRAVENOUS at 13:55

## 2018-03-26 RX ADMIN — IBUPROFEN 600 MG: 600 TABLET, FILM COATED ORAL at 20:07

## 2018-03-26 RX ADMIN — OXYCODONE HYDROCHLORIDE AND ACETAMINOPHEN 1 TABLET: 5; 325 TABLET ORAL at 14:52

## 2018-03-26 RX ADMIN — ONDANSETRON 4 MG: 2 INJECTION INTRAMUSCULAR; INTRAVENOUS at 10:04

## 2018-03-26 RX ADMIN — Medication 2000 ML/HR: at 11:08

## 2018-03-26 RX ADMIN — Medication 1 TABLET: at 18:36

## 2018-03-26 RX ADMIN — ONDANSETRON HYDROCHLORIDE 4 MG: 2 INJECTION, SOLUTION INTRAMUSCULAR; INTRAVENOUS at 10:04

## 2018-03-26 ASSESSMENT — PATIENT HEALTH QUESTIONNAIRE - PHQ9
1. LITTLE INTEREST OR PLEASURE IN DOING THINGS: NOT AT ALL
SUM OF ALL RESPONSES TO PHQ9 QUESTIONS 1 AND 2: 0
SUM OF ALL RESPONSES TO PHQ9 QUESTIONS 1 AND 2: 0
2. FEELING DOWN, DEPRESSED, IRRITABLE, OR HOPELESS: NOT AT ALL
SUM OF ALL RESPONSES TO PHQ9 QUESTIONS 1 AND 2: 0
1. LITTLE INTEREST OR PLEASURE IN DOING THINGS: NOT AT ALL
1. LITTLE INTEREST OR PLEASURE IN DOING THINGS: NOT AT ALL
2. FEELING DOWN, DEPRESSED, IRRITABLE, OR HOPELESS: NOT AT ALL

## 2018-03-26 ASSESSMENT — COPD QUESTIONNAIRES
HAVE YOU SMOKED AT LEAST 100 CIGARETTES IN YOUR ENTIRE LIFE: NO/DON'T KNOW
DURING THE PAST 4 WEEKS HOW MUCH DID YOU FEEL SHORT OF BREATH: NONE/LITTLE OF THE TIME
DO YOU EVER COUGH UP ANY MUCUS OR PHLEGM?: NO/ONLY WITH OCCASIONAL COLDS OR INFECTIONS
COPD SCREENING SCORE: 0

## 2018-03-26 ASSESSMENT — LIFESTYLE VARIABLES
EVER_SMOKED: NEVER
DO YOU DRINK ALCOHOL: NO
ALCOHOL_USE: NO

## 2018-03-26 ASSESSMENT — PAIN SCALES - GENERAL
PAINLEVEL_OUTOF10: 7
PAINLEVEL_OUTOF10: 2
PAINLEVEL_OUTOF10: 2

## 2018-03-26 NOTE — PROGRESS NOTES
0445_ Pt is , AJ  @ 38.2 weeks GA, who presents to LND c/o contractions since 3am every 10 minutes. TOCO and EFM applied. VS done and stable. 0520_E /-2.  0525_ Report to Dr Ybarra and orders received to admit pt.  0638_ Dr Ybarra @ bedside. E 2  0700_ Report to AUNG Joiner RN

## 2018-03-26 NOTE — PROGRESS NOTES
Pt comfortable  Cx 8/80/-2  Attempt to AROM no fluid seen  CTXs irreg  FHTs reactive, category I  Will follow    awestfall

## 2018-03-26 NOTE — H&P
DATE OF ADMISSION:  2018    IDENTIFICATION:  This is a 37-year-old  8, para 7-0-0-7, with an EDC of   2018, EGA of 38-2/7, who presents with chief complaint of uterine   contractions.    HISTORY OF PRESENT ILLNESS:  This is a patient of Dr. Stone.  She has   gotten good prenatal care.  She is of advanced maternal age.  She had normal    ultrasound with Dr. Gardner.  She declined genetic testing or   amniocentesis.  She has an elevated 1-hour, normal 3-hour and is known beta   strep negative.  She presents today complaining of uterine contractions.    Denies spontaneous rupture of membranes or vaginal bleeding.  Denies symptoms   of PIH, headaches, visual changes or epigastric pain.  She is uncomfortable,   requesting an epidural.  Fetal heart tracings reactive, category 1.  She is   janis regularly.    OBSTETRIC HISTORY:  Significant for 7 vaginal deliveries.    GYNECOLOGIC HISTORY:  No STDs.  Most recent Pap is normal.    MEDICAL HISTORY:  ALLERGIES:  None.    CURRENT MEDICATIONS:  Prenatal vitamins.    MEDICAL PROBLEMS:  None.    SURGICAL HISTORY:  Negative.    REVIEW OF SYSTEMS:  Review of systems x12 is negative per AMA standards   available in chart.    LABS:  She is O positive.  She is GBS negative.  She had an elevated 1-hour,   normal 3-hour.    PHYSICAL EXAMINATION:  VITAL SIGNS:  Patient is afebrile.  Vital signs within normal limits.  Fetal   heart tracings reactive, category 1.  She is janis irregularly.  GENERAL:  She is awake, alert, in no apparent distress.  NECK:  Supple.  HEART:  Regular.  CHEST:  Clear.  BREASTS:  Symmetrical.  ABDOMEN:  Soft, gravid, size appropriate for dates.  EXTREMITIES:  Negative.  GYNECOLOGIC:  She is 78, -2.    ASSESSMENT:  At this time is:  1.  Pregnancy at term.  2.  Advanced maternal age with normal ultrasound, declines genetic testing.  3.  Labor.  4.  Beta strep negative.  5.  Grand multiparity.    PLAN:  At this time:  1.   Admit.  2.  Fetal heart tone and contraction monitoring.  3.  Pain control.  4.  Pitocin if needed.  5.  Anticipate normal spontaneous vaginal delivery.       ____________________________________     MD DEWEY Will / BERNADETTE    DD:  03/26/2018 05:57:42  DT:  03/26/2018 06:12:26    D#:  [id number]  Job#:  [id number]

## 2018-03-26 NOTE — PROGRESS NOTES
Report received at 0700.  Patient comfortable with epidural at this time.  0800 Dr Vazquez at bedside for AROM/no fluid noted at this time.  Clear fluid noted prior to delivery.  1022 SVE is comlete and patient feeling pressure.  Room ready for delivery.  Dr Vazquez at bedside,  at 1103.      Recovery: Firm/light/at U.  Patient had clots and increased bleeding, 800 mcg of cytotec placed.  Bleeding improved.  1330 fundal rub done, 745ml of blood with clots out.  Methergine given. Dr Vazquez called and aware.  Adams replaced.  IV LR given.  Another 215 ml out.  Total of 1360ml.  Bleeding improved afterward.  Patient was very anxious with the increased bleeding.  Explanations given and  offered.  Patient asked FOB to translate when he was in the room.  Post partum hemorrhage explained to FOB and patient.  Bleeding stablized, 1600 Dr Vazquez at bedside.  Patient to be transferred after 1 more hour if patient tolerates getting up to bathroom. Patient feeling better, ambulated to bathroom, austin care performed, education given.  Patient transferred to post partum.  Report given to Bryanna MAHER.  Bands verified.

## 2018-03-27 VITALS
BODY MASS INDEX: 29.16 KG/M2 | HEIGHT: 65 IN | TEMPERATURE: 97.8 F | HEART RATE: 86 BPM | DIASTOLIC BLOOD PRESSURE: 57 MMHG | OXYGEN SATURATION: 97 % | SYSTOLIC BLOOD PRESSURE: 92 MMHG | RESPIRATION RATE: 20 BRPM | WEIGHT: 175 LBS

## 2018-03-27 LAB
ERYTHROCYTE [DISTWIDTH] IN BLOOD BY AUTOMATED COUNT: 49.7 FL (ref 35.9–50)
HCT VFR BLD AUTO: 28.4 % (ref 37–47)
HGB BLD-MCNC: 9.1 G/DL (ref 12–16)
MCH RBC QN AUTO: 27.6 PG (ref 27–33)
MCHC RBC AUTO-ENTMCNC: 32 G/DL (ref 33.6–35)
MCV RBC AUTO: 86.1 FL (ref 81.4–97.8)
PLATELET # BLD AUTO: 152 K/UL (ref 164–446)
PMV BLD AUTO: 11.6 FL (ref 9–12.9)
RBC # BLD AUTO: 3.3 M/UL (ref 4.2–5.4)
WBC # BLD AUTO: 12 K/UL (ref 4.8–10.8)

## 2018-03-27 PROCEDURE — 36415 COLL VENOUS BLD VENIPUNCTURE: CPT

## 2018-03-27 PROCEDURE — A9270 NON-COVERED ITEM OR SERVICE: HCPCS | Performed by: OBSTETRICS & GYNECOLOGY

## 2018-03-27 PROCEDURE — 85027 COMPLETE CBC AUTOMATED: CPT

## 2018-03-27 PROCEDURE — 700102 HCHG RX REV CODE 250 W/ 637 OVERRIDE(OP): Performed by: OBSTETRICS & GYNECOLOGY

## 2018-03-27 RX ORDER — IBUPROFEN 600 MG/1
600 TABLET ORAL EVERY 6 HOURS PRN
Qty: 30 TAB | Refills: 2 | Status: SHIPPED | OUTPATIENT
Start: 2018-03-27 | End: 2018-05-02

## 2018-03-27 RX ADMIN — OXYCODONE HYDROCHLORIDE AND ACETAMINOPHEN 1 TABLET: 5; 325 TABLET ORAL at 06:17

## 2018-03-27 RX ADMIN — IBUPROFEN 600 MG: 600 TABLET, FILM COATED ORAL at 06:17

## 2018-03-27 RX ADMIN — Medication 1 TABLET: at 07:17

## 2018-03-27 ASSESSMENT — LIFESTYLE VARIABLES: EVER_SMOKED: NEVER

## 2018-03-27 ASSESSMENT — PAIN SCALES - GENERAL
PAINLEVEL_OUTOF10: 7
PAINLEVEL_OUTOF10: 3

## 2018-03-27 NOTE — DISCHARGE INSTRUCTIONS
POSTPARTUM DISCHARGE INSTRUCTIONS FOR MOM    YOB: 1980   Age: 37 y.o.               Admit Date: 3/26/2018     Discharge Date: 3/27/2018  Attending Doctor:  Renita Hannah M.D.                  Allergies:  Nkda [no known drug allergy]    Discharged to home by car. Discharged via wheelchair, hospital escort: Yes.  Special equipment needed: Not Applicable  Belongings with: Personal  Be sure to schedule a follow-up appointment with your primary care doctor or any specialists as instructed.     Discharge Plan:   Diet Plan: Discussed  Activity Level: Discussed  Confirmed Follow up Appointment: Patient to Call and Schedule Appointment  Influenza Vaccine Indication: Indicated: 9 to 64 years of age  Influenza Vaccine Given - only chart on this line when given: Influenza Vaccine Given (See MAR)    REASONS TO CALL YOUR OBSTETRICIAN:  1.   Persistent fever or shaking chills (Temperature higher than 100.4)  2.   Heavy bleeding (soaking more than 1 pad per hour); Passing clots  3.   Foul odor from vagina  4.   Mastitis (Breast infection; breast pain, chills, fever, redness)  5.   Urinary pain, burning or frequency  6.   Episiotomy infection  7.   Abdominal incision infection  8.   Severe depression longer than 24 hours    HAND WASHING  · Prior to handling the baby.  · Before breastfeeding or bottle feeding baby.  · After using the bathroom or changing the baby's diaper.    VAGINAL CARE  · Nothing inside vagina for 6 weeks: no sexual intercourse, tampons or douching.  · Bleeding may continue for 2-4 weeks.  Amount may vary.    · Call your physician for heavy bleeding which means soaking more than 1 pad per hour    BIRTH CONTROL  · It is possible to become pregnant at any time after delivery and while breastfeeding.  · Plan to discuss a method of birth control with your physician at your follow up visit. visit.    DIET AND ELIMINATION  · Eating more fiber (bran cereal, fruits, and vegetables) and drinking plenty of  "fluids will help to avoid constipation.  · Urinary frequency after childbirth is normal.    POSTPARTUM BLUES  During the first few days after birth, you may experience a sense of the \"blues\" which may include impatience, irritability or even crying.  These feeling come and go quickly.  However, as many as 1 in 10 women experience emotional symptoms known as postpartum depression.    Postpartum depression:  May start as early as the second or third day after delivery or take several weeks or months to develop.  Symptoms of \"blues\" are present, but are more intense:  Crying spells; loss of appetite; feelings of hopelessness or loss of control; fear of touching the baby; over concern or no concern at all about the baby; little or no concern about your own appearance/caring for yourself; and/or inability to sleep or excessive sleeping.  Contact your physician if you are experiencing any of these symptoms.    Crisis Hotline:  · [place] Crisis Hotline:  4-173-JULHYJY  Or 1-980.702.9315  · [place] Crisis Hotline:  1-729.600.6815  Or 800-433-4912    PREVENTING SHAKEN BABY:  If you are angry or stressed, PUT THE BABY IN THE CRIB, step away, take some deep breaths, and wait until you are calm to care for the baby.  DO NOT SHAKE THE BABY.  You are not alone, call a supporter for help.    · [facility] Center 24/7 crisis line 817-465-0816 or 1-727.797.5859  · You can also text them, text \"ANSWER\" to 608680    DEPRESSION / SUICIDE RISK:  As you are discharged from this [facility] facility, it is important to learn how to keep safe from harming yourself.    Recognize the warning signs:  · Abrupt changes in personality, positive or negative- including increase in energy   · Giving away possessions  · Change in eating patterns- significant weight changes-  positive or negative  · Change in sleeping patterns- unable to sleep or sleeping all the time   · Unwillingness or inability to communicate  · Depression  · Unusual sadness, " discouragement and loneliness  · Talk of wanting to die  · Neglect of personal appearance   · Rebelliousness- reckless behavior  · Withdrawal from people/activities they love  · Confusion- inability to concentrate     If you or a loved one observes any of these behaviors or has concerns about self-harm, here's what you can do:  · Talk about it- your feelings and reasons for harming yourself  · Remove any means that you might use to hurt yourself (examples: pills, rope, extension cords, firearm)  · Get professional help from the community (Mental Health, Substance Abuse, psychological counseling)  · Do not be alone:Call your Safe Contact- someone whom you trust who will be there for you.  · Call your local CRISIS HOTLINE 045-6327 or 738-827-6579  · Call your local Children's Mobile Crisis Response Team [place] (418) 643-7340 or www.Talkbits  · Call the toll free National Suicide Prevention Hotlines   · National Suicide Prevention Lifeline 168-170-NMAT (7495)  · National Hope Line Network 800-SUICIDE (859-4629)    DISCHARGE SURVEY:  Thank you for choosing [facility].  We hope we provided you with very good care.  You may be receiving a survey in the mail.  Please fill it out.  Your opinion is valuable to us.    ADDITIONAL EDUCATIONAL MATERIALS GIVEN TO PATIENT:        My signature on this form indicates that:  1.  I have reviewed and understand the above information  2.  My questions regarding this information have been answered to my satisfaction.  3.  I have formulated a plan with my discharge nurse to obtain my prescribed medication for home.

## 2018-03-27 NOTE — CONSULTS
Per RN report, MOB will plan to do both breast and bottle feeding, has her own feeding plan- which will be primarily bottle feeding in hospital, and mixed feedings at home.

## 2018-03-27 NOTE — CARE PLAN
Problem: Altered physiologic condition related to immediate post-delivery state and potential for bleeding/hemorrhage  Goal: Patient physiologically stable as evidenced by normal lochia, palpable uterine involution and vital signs within normal limits  Outcome: PROGRESSING AS EXPECTED  FF@U with light lochia    Problem: Potential for postpartum infection related to presence of episiotomy/vaginal tear and/or uterine contamination  Goal: Patient will be absent from signs and symptoms of infection  Outcome: PROGRESSING AS EXPECTED  No signs of infection noted at time of assessment

## 2018-03-27 NOTE — PROGRESS NOTES
MOB declines assistance with breastfeeding at this time.  States is going to continue bottle feeding infant with formula while in the hospital and will do a mixture of bottle feeding and breastfeeding when at home.  RYAN reports she breast fed last child for 1 month and this was the longest she had breast fed for.  Again, declined assistance with latching infant onto breast.    RYAN has [employer] medical insurance and is a [facility] participant.  She attends the [facility] office on [address] [place] [address], in [place].  MOB aware of the outpatient lactation assistance available to her through [facility].    Supplementation guidelines ([place] Abhishek table) reviewed with MOB and hand-out of guidelines given.      MOB verbalized understanding of all information given to her and denies having any further questions at this time.  Encouraged to call for assistance as needed.

## 2018-03-27 NOTE — PROGRESS NOTES
- Bedside report received, assumed care of patient.  Pt sitting up in bed with no s/s of distress, denies needs at this time.  - Pt up to bathroom with standby assist, tolerated well.  Pt was able to void adequate amount without difficulty.  Pt steady on feet, back to bed without problems.  - Pt assessment complete, wnl.  Bonding well with infant, breast and bottle feeding independently.  Discussed/educated on amounts of formula to give at a time; to always breast feed first; and the size of 's stomach.  Pt verbalized understanding.  Plan of care discussed with patient for the night.  Questions answered.  Family at bedside and supportive.  Encouraged to call with needs.

## 2018-03-27 NOTE — CARE PLAN
Problem: Altered physiologic condition related to immediate post-delivery state and potential for bleeding/hemorrhage  Goal: Patient physiologically stable as evidenced by normal lochia, palpable uterine involution and vital signs within normal limits  Outcome: PROGRESSING AS EXPECTED  Fundus firm, lochia light, VSS.    Problem: Alteration in comfort related to episiotomy, vaginal repair and/or after birth pains  Goal: Patient is able to ambulate, care for self and infant  Outcome: PROGRESSING AS EXPECTED  Pt able to ambulate and care for self and infant independently, taking po pain medications as needed.

## 2018-03-27 NOTE — PROGRESS NOTES
Assessment done vital signs stable. Patient progressing according to plan of care. Fundus firm at the umbilicus with light lochia. Patient up voiding without difficulty. Ambulating with steady gait. Claims to have good pain relief with p.o medications. Breast and bottle feeding infant on demand. Family at bedside. Patient denies problems at this time. Patient encouraged to call for any needs. Pt claims she will call if medications are needed

## 2018-03-27 NOTE — DELIVERY NOTE
DATE OF SERVICE:  2018    DELIVERING PHYSICIAN:  Dianna Vazquez MD    PRIMARY OBSTETRICIAN:  Renita Hannah MD    BRIEF HISTORY:  This is a 37-year-old  8, para 7 at 38-2/7th weeks.    She was admitted this morning by our on-call partner, Dr. Owen Ybarra.  She   had essentially an uncomplicated  course and was admitted with active   labor.  When I assumed her care, she was 8 cm, 80%, -2 station with an   epidural intact.  She progressed well slowly for her parity, but well to   completely dilate at approximately 10:20 and proceeded with delivery at 11:00.    Fetal heart tracing is reactive, reassuring and category 1.  Group B strep   culture is negative.    DELIVERY NOTE:  Delivery is normal spontaneous vaginal delivery.  Fetal   position, left occiput anterior.    COMPLICATIONS:  None.    ESTIMATED BLOOD LOSS:  300 mL.    INFANT:  Female with Apgars 8 and 9 at 1 and 5 minutes respectively.  Weight   pending at time of delivery.  Lacerations, none.    NARRATIVE OF DELIVERY:  I was called.  Patient was complete and feeling   pressure.  She began pushing.  She brought the baby down well.  She was in the   dorsal lithotomy position, prepped appropriately.  With the ensuing several   contractions, she delivered the fetal vertex in a left occiput anterior   presentation over intact perineum.  Nose and mouth suctioned with bulb suction   and the remainder of the infant delivered spontaneously and a pink vigorous   infant was handed directly to the mother's chest.  Approximately 2 minutes   after delivery, the cord was doubly clamped and was cut.  Placenta was   delivered spontaneously, carefully inspected and found to be intact with a   3-vessel cord.  IV Pitocin, bimanual massage, fundus firmed up well with   moderate postpartum bleeding.  Vagina and perineum were carefully inspected.    There were no lacerations.  All area was hemostatic and the procedure was   ended.  Sponge counts were  correct and both mother and baby were tolerating   delivery well and were stable and bonding postpartum.       ____________________________________     MD HEAVEN CONN / BERNADETTE    DD:  03/27/2018 02:45:49  DT:  03/27/2018 04:11:26    D#:  [id number]  Job#:  [id number]    cc: Renita Hannah MD

## 2018-03-27 NOTE — PROGRESS NOTES
PPD # 1 S/P     Doing well.  Voiding, ambulating, breast & bottle feeding.  Denies heavy bleeding.    AF, VS wnl  AAO, NAD  Abd: S/NT/ND, fundus firm  Calves: NT, no edema    Hct 28      Plan:  Requests discharge home.  Home today.  Instructions reviewed.  F/u 6 weeks.  Rx for motrin.  Pelvic rest 6 weeks.

## 2018-03-27 NOTE — CARE PLAN
Problem: Alteration in comfort related to episiotomy, vaginal repair and/or after birth pains  Goal: Patient is able to ambulate, care for self and infant  Patient is able to ambulate.    Problem: Potential knowledge deficit related to lack of understanding of self and  care  Goal: Patient will verbalize understanding of self and infant care  Patient verbalizes understanding to care for self and infant.

## 2018-03-27 NOTE — PROGRESS NOTES
Patient was admitted to the floor. VSS. Bleeding light and fundus firm. Bands and cuddles checked. Patient oriented to the room. Will monitor.

## 2018-04-07 ENCOUNTER — HOSPITAL ENCOUNTER (EMERGENCY)
Facility: MEDICAL CENTER | Age: 38
End: 2018-04-07
Attending: EMERGENCY MEDICINE
Payer: COMMERCIAL

## 2018-04-07 VITALS
OXYGEN SATURATION: 97 % | HEART RATE: 103 BPM | TEMPERATURE: 98.2 F | BODY MASS INDEX: 27.49 KG/M2 | WEIGHT: 165 LBS | RESPIRATION RATE: 12 BRPM | HEIGHT: 65 IN | DIASTOLIC BLOOD PRESSURE: 76 MMHG | SYSTOLIC BLOOD PRESSURE: 116 MMHG

## 2018-04-07 DIAGNOSIS — R10.13 EPIGASTRIC PAIN: ICD-10-CM

## 2018-04-07 LAB
ALBUMIN SERPL BCP-MCNC: 3.7 G/DL (ref 3.2–4.9)
ALBUMIN/GLOB SERPL: 1 G/DL
ALP SERPL-CCNC: 148 U/L (ref 30–99)
ALT SERPL-CCNC: 31 U/L (ref 2–50)
ANION GAP SERPL CALC-SCNC: 9 MMOL/L (ref 0–11.9)
AST SERPL-CCNC: 64 U/L (ref 12–45)
BASOPHILS # BLD AUTO: 0.2 % (ref 0–1.8)
BASOPHILS # BLD: 0.02 K/UL (ref 0–0.12)
BILIRUB SERPL-MCNC: 0.4 MG/DL (ref 0.1–1.5)
BUN SERPL-MCNC: 20 MG/DL (ref 8–22)
CALCIUM SERPL-MCNC: 8.6 MG/DL (ref 8.5–10.5)
CHLORIDE SERPL-SCNC: 104 MMOL/L (ref 96–112)
CO2 SERPL-SCNC: 23 MMOL/L (ref 20–33)
CREAT SERPL-MCNC: 0.7 MG/DL (ref 0.5–1.4)
EOSINOPHIL # BLD AUTO: 0.1 K/UL (ref 0–0.51)
EOSINOPHIL NFR BLD: 1.1 % (ref 0–6.9)
ERYTHROCYTE [DISTWIDTH] IN BLOOD BY AUTOMATED COUNT: 46.4 FL (ref 35.9–50)
GLOBULIN SER CALC-MCNC: 3.7 G/DL (ref 1.9–3.5)
GLUCOSE SERPL-MCNC: 104 MG/DL (ref 65–99)
HCT VFR BLD AUTO: 35.5 % (ref 37–47)
HGB BLD-MCNC: 11.3 G/DL (ref 12–16)
IMM GRANULOCYTES # BLD AUTO: 0.04 K/UL (ref 0–0.11)
IMM GRANULOCYTES NFR BLD AUTO: 0.4 % (ref 0–0.9)
LIPASE SERPL-CCNC: 42 U/L (ref 11–82)
LYMPHOCYTES # BLD AUTO: 2.86 K/UL (ref 1–4.8)
LYMPHOCYTES NFR BLD: 30.4 % (ref 22–41)
MCH RBC QN AUTO: 27.3 PG (ref 27–33)
MCHC RBC AUTO-ENTMCNC: 31.8 G/DL (ref 33.6–35)
MCV RBC AUTO: 85.7 FL (ref 81.4–97.8)
MONOCYTES # BLD AUTO: 0.52 K/UL (ref 0–0.85)
MONOCYTES NFR BLD AUTO: 5.5 % (ref 0–13.4)
NEUTROPHILS # BLD AUTO: 5.88 K/UL (ref 2–7.15)
NEUTROPHILS NFR BLD: 62.4 % (ref 44–72)
NRBC # BLD AUTO: 0 K/UL
NRBC BLD-RTO: 0 /100 WBC
PLATELET # BLD AUTO: 322 K/UL (ref 164–446)
PMV BLD AUTO: 10.2 FL (ref 9–12.9)
POTASSIUM SERPL-SCNC: 3.8 MMOL/L (ref 3.6–5.5)
PROT SERPL-MCNC: 7.4 G/DL (ref 6–8.2)
RBC # BLD AUTO: 4.14 M/UL (ref 4.2–5.4)
SODIUM SERPL-SCNC: 136 MMOL/L (ref 135–145)
WBC # BLD AUTO: 9.4 K/UL (ref 4.8–10.8)

## 2018-04-07 PROCEDURE — 700102 HCHG RX REV CODE 250 W/ 637 OVERRIDE(OP): Performed by: EMERGENCY MEDICINE

## 2018-04-07 PROCEDURE — 700111 HCHG RX REV CODE 636 W/ 250 OVERRIDE (IP): Performed by: EMERGENCY MEDICINE

## 2018-04-07 PROCEDURE — 36415 COLL VENOUS BLD VENIPUNCTURE: CPT

## 2018-04-07 PROCEDURE — 85025 COMPLETE CBC W/AUTO DIFF WBC: CPT

## 2018-04-07 PROCEDURE — 96374 THER/PROPH/DIAG INJ IV PUSH: CPT

## 2018-04-07 PROCEDURE — 80053 COMPREHEN METABOLIC PANEL: CPT

## 2018-04-07 PROCEDURE — 99285 EMERGENCY DEPT VISIT HI MDM: CPT

## 2018-04-07 PROCEDURE — 83690 ASSAY OF LIPASE: CPT

## 2018-04-07 PROCEDURE — A9270 NON-COVERED ITEM OR SERVICE: HCPCS | Performed by: EMERGENCY MEDICINE

## 2018-04-07 RX ORDER — OMEPRAZOLE 40 MG/1
40 CAPSULE, DELAYED RELEASE ORAL 2 TIMES DAILY
Qty: 30 CAP | Refills: 0 | Status: SHIPPED | OUTPATIENT
Start: 2018-04-07 | End: 2018-04-22

## 2018-04-07 RX ORDER — ONDANSETRON 2 MG/ML
4 INJECTION INTRAMUSCULAR; INTRAVENOUS ONCE
Status: COMPLETED | OUTPATIENT
Start: 2018-04-07 | End: 2018-04-07

## 2018-04-07 RX ADMIN — ONDANSETRON 4 MG: 2 INJECTION, SOLUTION INTRAMUSCULAR; INTRAVENOUS at 20:56

## 2018-04-07 RX ADMIN — LIDOCAINE HYDROCHLORIDE 30 ML: 20 SOLUTION OROPHARYNGEAL at 20:57

## 2018-04-07 ASSESSMENT — PAIN SCALES - GENERAL
PAINLEVEL_OUTOF10: 0
PAINLEVEL_OUTOF10: 8

## 2018-04-07 ASSESSMENT — LIFESTYLE VARIABLES: DO YOU DRINK ALCOHOL: NO

## 2018-04-08 NOTE — ED PROVIDER NOTES
"ED Provider Note    CHIEF COMPLAINT  Chief Complaint   Patient presents with   • Abdominal Pain     Pt is having epigastric pain that radiates to her back starting an hour ago. Also having N/V and constipation. SO at bedside. Recent pregnancy delivered March 26.        [facility]  Karolyn Hurley is a 37 y.o. female who presents with abdominal pain. The patient states about an hour prior to arrival she developed severe epigastric discomfort. She describes it as a burning and sharp pain. She does not have any associated difficulty with breathing. She does have some nausea and vomiting. She has not had any fevers. She did have a normal spontaneous vaginal delivery on 26 March with no complications. She has not had any prior abdominal surgeries. She is unaware of any exacerbating and relieving factors. Currently her pain is moderate in intensity.    REVIEW OF SYSTEMS  See HPI for further details. All other systems are negative.     PAST MEDICAL HISTORY  No past medical history on file.    SOCIAL HISTORY  Social History     Social History   • Marital status:      Spouse name: N/A   • Number of children: N/A   • Years of education: N/A     Social History Main Topics   • Smoking status: Never Smoker   • Smokeless tobacco: Never Used   • Alcohol use No   • Drug use: No   • Sexual activity: Yes     Partners: Male     Other Topics Concern   • Not on file     Social History Narrative   • No narrative on file           PHYSICAL EXAM  VITAL SIGNS: /76   Pulse 96   Temp 36.8 °C (98.2 °F)   Resp 12   Ht 1.651 m (5' 5\")   Wt 74.8 kg (165 lb)   LMP 07/07/2017 (Approximate)   SpO2 96%   BMI 27.46 kg/m²   Constitutional: in acute distress, Non-toxic appearance.   HENT: Normocephalic, Atraumatic, tympanic membranes are intact and nonerythematous bilaterally, Oropharynx moist without exudates or erythema, Nose normal.   Eyes: PERRLA, EOMI, Conjunctiva normal.  Neck: Supple without meningismus  Lymphatic: No " lymphadenopathy noted.   Cardiovascular: Normal heart rate, Normal rhythm, No murmurs, No rubs, No gallops.   Thorax & Lungs: Normal breath sounds, No respiratory distress, No wheezing, No chest tenderness.   Abdomen: Epigastric discomfort to deep palpation, no rebound, no guarding, normal bowel sounds.   Skin: Warm, Dry, No erythema, No rash.   Back: No tenderness, No CVA tenderness.   Extremities: Atraumatic with symmetric distal pulses, No edema, No tenderness, No cyanosis, No clubbing.   Neurologic: Alert & oriented x 3, cranial nerves II through XII are intact, Normal motor function, Normal sensory function, No focal deficits noted.   Psychiatric: Affect normal, Judgment normal, Mood normal.       COURSE & MEDICAL DECISION MAKING  Pertinent Labs & Imaging studies reviewed. (See chart for details)  This a 37-year-old female who presents with epigastric discomfort. She had pretty much complete resolution of her discomfort with a GI cocktail. Initially she did receive some Zofran for the nausea and vomiting. The patient does have an elevation in her AST at 64 and her alkaline phosphatase at 148 however she does not have any significant discomfort at the time of discharge therefore early cholecystitis would be unlikely but cannot be excluded. The patient be discharged home on Prilosec and she will follow up with her primary care doctor in 4-5 days. She will return to the emergency department sooner for increased pain or persistent vomiting.  FINAL IMPRESSION  1. Abdominal pain  2. Suspect gastritis     Disposition  The patient will be discharged home in an asymptomatic state     Electronically signed by: Amrit Forrest, 4/7/2018 8:50 PM

## 2018-04-08 NOTE — DISCHARGE INSTRUCTIONS
Return for increased pain or persistent vomiting  Follow-up with your primary care doctor in 4-5 days

## 2018-05-02 ENCOUNTER — OFFICE VISIT (OUTPATIENT)
Dept: MEDICAL GROUP | Age: 38
End: 2018-05-02
Payer: COMMERCIAL

## 2018-05-02 VITALS
WEIGHT: 165 LBS | TEMPERATURE: 97.8 F | BODY MASS INDEX: 27.49 KG/M2 | HEIGHT: 65 IN | SYSTOLIC BLOOD PRESSURE: 118 MMHG | DIASTOLIC BLOOD PRESSURE: 68 MMHG | RESPIRATION RATE: 16 BRPM | OXYGEN SATURATION: 96 % | HEART RATE: 100 BPM

## 2018-05-02 DIAGNOSIS — R79.89 LFT ELEVATION: ICD-10-CM

## 2018-05-02 DIAGNOSIS — R10.13 EPIGASTRIC PAIN: ICD-10-CM

## 2018-05-02 DIAGNOSIS — K59.01 SLOW TRANSIT CONSTIPATION: ICD-10-CM

## 2018-05-02 PROCEDURE — 99215 OFFICE O/P EST HI 40 MIN: CPT | Performed by: INTERNAL MEDICINE

## 2018-05-03 NOTE — PROGRESS NOTES
Subjective:   Karolyn Hurley is a 37 y.o. female here today for evaluation and management of:      Epigastric pain  Patient stated she has upper mid abdominal pain for 6 months. Pain is intermittent and very sharp and sometimes bloating sensation. Pain radiated to her back and left flank. She noticed pain mostly after eating. She reports that she has been eating high fat diet, such as pizza or doughnut. She denies fever, chills or vomiting, but she has nausea when she has pain. Patient had similar pain on 4/7/18 and she stated that her pain is severe, so she went to ER. She had blood work done in ER showed that she has slightly elevated liver enzymes, normal Lipase, slightly anemic. She was treated with GI cocktail and discharged home. She did not have any imaging study in ER. She also tried Prilosec with a little help only. She stopped taking Prilosec already.    LFT elevation  Patient has elevated liver enzymes on 4/7/18. She denied drinking alcohol. She eats high fat diet most of the time. She delivered her daughter, 5 weeks ago. She stated that her daughter is in bottle feeding and she only breast-feeds her daughter in the morning and night twice a day.    Slow transit constipation  Patient stated that she has constipation. She still has abdominal bloating and constipation. She feels gassy all the time. She is taking milk of magnesium as needed. She has not taken it for 2 weeks.         Current medicines (including changes today)  Current Outpatient Prescriptions   Medication Sig Dispense Refill   • prenatal plus vitamin (STUARTNATAL 1+1) 27-1 MG Tab tablet Take 1 Tab by mouth every morning. 30 Tab 11     No current facility-administered medications for this visit.      She  has no past medical history of Asthma; Congestive heart failure (HCC); Diabetes (HCC); or Hypertension.    ROS   No chest pain, no shortness of breath, no abdominal pain       Objective:     Blood pressure 118/68, pulse 100,  "temperature 36.6 °C (97.8 °F), resp. rate 16, height 1.651 m (5' 5\"), weight 74.8 kg (165 lb), SpO2 96 %, unknown if currently breastfeeding. Body mass index is 27.46 kg/m².   Physical Exam:  General: Alert, oriented and no acute distress.  Eye contact is good, speech goal directed, affect calm  HEENT: conjunctiva non-injected, sclera non-icteric.  Oral mucous membranes pink and moist with no lesions.  Pinna normal.   Lungs: Normal respiratory effort, clear to auscultation bilaterally with good excursion.  CV: regular rate and rhythm. No murmurs. No carotid bruits.  Abdomen: soft, mild distended, tender on epigastrium, no guarding , no rigidity , no rebound tenderness. No CVAT, Bowel sound normal.  Ext: no edema, color normal, vascularity normal, temperature normal        Assessment and Plan:   The following treatment plan was discussed     1. Epigastric pain  - I reviewed her blood tests and ER visit note with patient and  today and answered all the questions that they have.  - No signs of acute abdomen on exam. I discussed possible causes with patient. Given her age, body habitus, fertility, pain after a fatty meal, I concerned that she may have gallbladder inflammation or stone as well as fatty liver. Order abdominal ultrasound.  - Recommend to avoid all NSAIDs. Patient used to take ibuprofen as needed for headache. Recommended to take Tylenol only for headache or joint pain.  - We discussed to avoid acidic food, spicy foods, fatty food. I advised patient to eat lean meat, vegetables and fruits.  - We will also test for H. pylori to rule out H pylori induced gastritis or peptic ulcer disease. Patient is advised to stop taking milk of magnesium and GI cocktail for 2 weeks before stool H. pylori antigen test.  - H.PYLORI STOOL ANTIGEN; Future  - US-ABDOMEN COMPLETE SURVEY; Future  - CBC WITH DIFFERENTIAL; Future  - COMP METABOLIC PANEL; Future    2. Slow transit constipation  - Discussed to increase water " intake and fiber intake. She can take Metamucil as needed. Recommend to avoid eating beans or garlic or excessive caffeine or tea.  - CBC WITH DIFFERENTIAL; Future  - COMP METABOLIC PANEL; Future    3. LFT elevation  - Will check viral hepatitis panel, recheck CMP in 4 weeks.  - Order abdominal ultrasound for further evaluation.  - Recommend to avoid NSAIDs and alcohol.  - US-ABDOMEN COMPLETE SURVEY; Future  - COMP METABOLIC PANEL; Future  - HEPATITIS PANEL ACUTE(4 COMPONENTS); Future    Face-to-face time spent 40 minutes with patient and more than half of that time spent for counseling and cooperating of care for medical problems listed above.       Followup: Return in about 4 weeks (around 5/30/2018), or if symptoms worsen or fail to improve, for epigastric pain, elevated liver enzymes, constipation, lab review.      Please note that this dictation was created using voice recognition software. I have made every reasonable attempt to correct obvious errors, but I expect that there may have unintended errors in text, spelling, punctuation, or grammar that I did not discover.

## 2018-05-03 NOTE — ASSESSMENT & PLAN NOTE
Patient stated she has upper mid abdominal pain for 6 months. Pain is intermittent and very sharp and sometimes bloating sensation. Pain radiated to her back and left flank. She noticed pain mostly after eating. She reports that she has been eating high fat diet, such as pizza or doughnut. She denies fever, chills or vomiting, but she has nausea when she has pain. Patient had similar pain on 4/7/18 and she stated that her pain is severe, so she went to ER. She had blood work done in ER showed that she has slightly elevated liver enzymes, normal Lipase, slightly anemic. She was treated with GI cocktail and discharged home. She did not have any imaging study in ER. She also tried Prilosec with a little help only. She stopped taking Prilosec already.

## 2018-05-03 NOTE — ASSESSMENT & PLAN NOTE
Patient stated that she has constipation. She still has abdominal bloating and constipation. She feels gassy all the time. She is taking milk of magnesium as needed. She has not taken it for 2 weeks.

## 2018-05-03 NOTE — ASSESSMENT & PLAN NOTE
Patient has elevated liver enzymes on 4/7/18. She denied drinking alcohol. She eats high fat diet most of the time. She delivered her daughter, 5 weeks ago. She stated that her daughter is in bottle feeding and she only breast-feeds her daughter in the morning and night twice a day.

## 2018-05-16 ENCOUNTER — HOSPITAL ENCOUNTER (OUTPATIENT)
Dept: RADIOLOGY | Facility: MEDICAL CENTER | Age: 38
End: 2018-05-16
Attending: INTERNAL MEDICINE
Payer: COMMERCIAL

## 2018-05-16 DIAGNOSIS — R79.89 LFT ELEVATION: ICD-10-CM

## 2018-05-16 DIAGNOSIS — R10.13 EPIGASTRIC PAIN: ICD-10-CM

## 2018-05-16 PROCEDURE — 76705 ECHO EXAM OF ABDOMEN: CPT

## 2018-06-14 ENCOUNTER — HOSPITAL ENCOUNTER (OUTPATIENT)
Dept: LAB | Facility: MEDICAL CENTER | Age: 38
End: 2018-06-14
Attending: INTERNAL MEDICINE
Payer: COMMERCIAL

## 2018-06-14 ENCOUNTER — HOSPITAL ENCOUNTER (OUTPATIENT)
Facility: MEDICAL CENTER | Age: 38
End: 2018-06-14
Attending: INTERNAL MEDICINE
Payer: COMMERCIAL

## 2018-06-14 DIAGNOSIS — R10.13 EPIGASTRIC PAIN: ICD-10-CM

## 2018-06-14 DIAGNOSIS — K59.01 SLOW TRANSIT CONSTIPATION: ICD-10-CM

## 2018-06-14 DIAGNOSIS — R79.89 LFT ELEVATION: ICD-10-CM

## 2018-06-14 LAB
ALBUMIN SERPL BCP-MCNC: 4.3 G/DL (ref 3.2–4.9)
ALBUMIN/GLOB SERPL: 1.1 G/DL
ALP SERPL-CCNC: 74 U/L (ref 30–99)
ALT SERPL-CCNC: 41 U/L (ref 2–50)
ANION GAP SERPL CALC-SCNC: 8 MMOL/L (ref 0–11.9)
AST SERPL-CCNC: 29 U/L (ref 12–45)
BASOPHILS # BLD AUTO: 0.7 % (ref 0–1.8)
BASOPHILS # BLD: 0.1 K/UL (ref 0–0.12)
BILIRUB SERPL-MCNC: 0.4 MG/DL (ref 0.1–1.5)
BUN SERPL-MCNC: 13 MG/DL (ref 8–22)
CALCIUM SERPL-MCNC: 8.6 MG/DL (ref 8.5–10.5)
CHLORIDE SERPL-SCNC: 106 MMOL/L (ref 96–112)
CO2 SERPL-SCNC: 23 MMOL/L (ref 20–33)
CREAT SERPL-MCNC: 0.7 MG/DL (ref 0.5–1.4)
EOSINOPHIL # BLD AUTO: 0.02 K/UL (ref 0–0.51)
EOSINOPHIL NFR BLD: 0.1 % (ref 0–6.9)
ERYTHROCYTE [DISTWIDTH] IN BLOOD BY AUTOMATED COUNT: 48.2 FL (ref 35.9–50)
GLOBULIN SER CALC-MCNC: 3.9 G/DL (ref 1.9–3.5)
GLUCOSE SERPL-MCNC: 107 MG/DL (ref 65–99)
HAV IGM SERPL QL IA: NEGATIVE
HBV CORE IGM SER QL: NEGATIVE
HBV SURFACE AG SER QL: NEGATIVE
HCT VFR BLD AUTO: 40.9 % (ref 37–47)
HCV AB SER QL: NEGATIVE
HGB BLD-MCNC: 12.4 G/DL (ref 12–16)
IMM GRANULOCYTES # BLD AUTO: 0.05 K/UL (ref 0–0.11)
IMM GRANULOCYTES NFR BLD AUTO: 0.4 % (ref 0–0.9)
LYMPHOCYTES # BLD AUTO: 1.18 K/UL (ref 1–4.8)
LYMPHOCYTES NFR BLD: 8.4 % (ref 22–41)
MCH RBC QN AUTO: 25.7 PG (ref 27–33)
MCHC RBC AUTO-ENTMCNC: 30.3 G/DL (ref 33.6–35)
MCV RBC AUTO: 84.9 FL (ref 81.4–97.8)
MONOCYTES # BLD AUTO: 0.67 K/UL (ref 0–0.85)
MONOCYTES NFR BLD AUTO: 4.8 % (ref 0–13.4)
NEUTROPHILS # BLD AUTO: 12 K/UL (ref 2–7.15)
NEUTROPHILS NFR BLD: 85.6 % (ref 44–72)
NRBC # BLD AUTO: 0 K/UL
NRBC BLD-RTO: 0 /100 WBC
PLATELET # BLD AUTO: 203 K/UL (ref 164–446)
PMV BLD AUTO: 11.5 FL (ref 9–12.9)
POTASSIUM SERPL-SCNC: 3.9 MMOL/L (ref 3.6–5.5)
PROT SERPL-MCNC: 8.2 G/DL (ref 6–8.2)
RBC # BLD AUTO: 4.82 M/UL (ref 4.2–5.4)
SODIUM SERPL-SCNC: 137 MMOL/L (ref 135–145)
WBC # BLD AUTO: 14 K/UL (ref 4.8–10.8)

## 2018-06-14 PROCEDURE — 80053 COMPREHEN METABOLIC PANEL: CPT

## 2018-06-14 PROCEDURE — 80074 ACUTE HEPATITIS PANEL: CPT

## 2018-06-14 PROCEDURE — 87338 HPYLORI STOOL AG IA: CPT

## 2018-06-14 PROCEDURE — 36415 COLL VENOUS BLD VENIPUNCTURE: CPT

## 2018-06-14 PROCEDURE — 85025 COMPLETE CBC W/AUTO DIFF WBC: CPT

## 2018-06-15 DIAGNOSIS — R10.13 EPIGASTRIC PAIN: ICD-10-CM

## 2018-06-15 LAB — H PYLORI AG STL QL IA: NOT DETECTED

## 2018-06-19 ENCOUNTER — OFFICE VISIT (OUTPATIENT)
Dept: MEDICAL GROUP | Age: 38
End: 2018-06-19
Payer: COMMERCIAL

## 2018-06-19 VITALS
TEMPERATURE: 98.4 F | DIASTOLIC BLOOD PRESSURE: 72 MMHG | OXYGEN SATURATION: 98 % | SYSTOLIC BLOOD PRESSURE: 112 MMHG | WEIGHT: 162 LBS | HEART RATE: 89 BPM | BODY MASS INDEX: 27.66 KG/M2 | HEIGHT: 64 IN

## 2018-06-19 DIAGNOSIS — K76.0 FATTY LIVER: ICD-10-CM

## 2018-06-19 DIAGNOSIS — J02.8 ACUTE PHARYNGITIS DUE TO OTHER SPECIFIED ORGANISMS: ICD-10-CM

## 2018-06-19 DIAGNOSIS — K59.01 SLOW TRANSIT CONSTIPATION: ICD-10-CM

## 2018-06-19 DIAGNOSIS — R10.13 EPIGASTRIC PAIN: ICD-10-CM

## 2018-06-19 PROBLEM — Z34.91 FIRST TRIMESTER PREGNANCY: Status: RESOLVED | Noted: 2017-09-26 | Resolved: 2018-06-19

## 2018-06-19 PROCEDURE — 99214 OFFICE O/P EST MOD 30 MIN: CPT | Performed by: INTERNAL MEDICINE

## 2018-06-19 RX ORDER — VITAMIN E 268 MG
400 CAPSULE ORAL DAILY
COMMUNITY
End: 2019-05-04 | Stop reason: CLARIF

## 2018-06-19 RX ORDER — ACETAMINOPHEN 325 MG/1
650 TABLET ORAL EVERY 4 HOURS PRN
COMMUNITY
End: 2018-12-17

## 2018-06-19 RX ORDER — AMOXICILLIN 875 MG/1
875 TABLET, COATED ORAL 2 TIMES DAILY
Qty: 14 TAB | Refills: 0 | Status: SHIPPED | OUTPATIENT
Start: 2018-06-19 | End: 2018-06-26

## 2018-06-19 RX ORDER — CALCIUM CARBONATE/VITAMIN D3 600 MG-10
TABLET ORAL 2 TIMES DAILY WITH MEALS
COMMUNITY
End: 2019-05-04 | Stop reason: CLARIF

## 2018-06-19 NOTE — LETTER
[facility]  Carmelita Covington M.D.  [address]  [place] [address]  Fax: [phone]   Authorization for Release/Disclosure of   Protected Health Information   Name: MARÍA NATHAN : 1980 SSN: [id number]   Address: [address] [place] [address] Phone:    [phone] (home)    I authorize the entity listed below to release/disclose the PHI below to:   [facility] [employer]/Carmelita Covington M.D. and Carmelita Covington M.D.   Provider or Entity Name:  [facility] -[facility]   Address : [address], [place] [address]     [facility], [facility]   Phone:      Fax:     Reason for request: continuity of care   Information to be released:    [  ] LAST COLONOSCOPY,  including any PATH REPORT and follow-up  [  ] LAST FIT/COLOGUARD RESULT [  ] LAST DEXA  [  ] LAST MAMMOGRAM  [ XXX ] LAST PAP  [  ] LAST LABS [  ] RETINA EXAM REPORT  [  ] IMMUNIZATION RECORDS  [  ] Release all info      [  ] Check here and initial the line next to each item to release ALL health information INCLUDING  _____ Care and treatment for drug and / or alcohol abuse  _____ HIV testing, infection status, or AIDS  _____ Genetic Testing    DATES OF SERVICE OR TIME PERIOD TO BE DISCLOSED: _____________  I understand and acknowledge that:  * This Authorization may be revoked at any time by you in writing, except if your health information has already been used or disclosed.  * Your health information that will be used or disclosed as a result of you signing this authorization could be re-disclosed by the recipient. If this occurs, your re-disclosed health information may no longer be protected by State or Federal laws.  * You may refuse to sign this Authorization. Your refusal will not affect your ability to obtain treatment.  * This Authorization becomes effective upon signing and will  on (date) __________.      If no date is indicated, this Authorization will  one (1) year from the signature date.    Name:  Karolyn Hurley    Signature:   Date:     6/19/2018       PLEASE FAX REQUESTED RECORDS BACK TO: [phone]

## 2018-06-20 NOTE — ASSESSMENT & PLAN NOTE
Patient reported that her epigastric pain improved after decreasing the amount of the food that she eats.  She does not have nausea or vomiting or fever or abdominal pain currently.  She has negative H pylori stool antigen.  Ultrasound on 5/16/18 showed gallstones but no inflammation of gallbladder or no biliary tree obstruction.

## 2018-06-20 NOTE — ASSESSMENT & PLAN NOTE
Patient reported that she still has constipation and abdominal bloating.  She has not tried Metamucil or any stool softeners yet.  She states that she does not breast-feeding her baby.  She is bottlefeeding her 4-month-old daughter currently.  We discussed to increase fiber intake by eating more vegetables and increase water intake.  I also advised patient to act on Metamucil daily.  She can have MiraLAX once a day as needed if she still does not have bowel movement from fiber supplement.

## 2018-06-20 NOTE — ASSESSMENT & PLAN NOTE
Patient reports that she has sore throat, fever, chills last week.  She has elevated total white blood cell count and neutrophil count and recent blood test done in last week.  Patient reported sick contacts at home.  She also noticed swollen gland on left side of the neck and painful on touching.

## 2018-06-20 NOTE — ASSESSMENT & PLAN NOTE
Patient has elevated liver enzymes on 4/7/18.  Patient stated that she does not drink alcohol at all.  Repeated blood test on 6/14/18 showed that liver enzymes are back to normal.  She has negative viral hepatitis panel on 6/14/18.  Liver ultrasound on 5/16/18 showed hepatomegaly, fatty liver, fibrosis not excluded, gallbladder sludge and stone, no dilation of common bile duct, no free fluid.  I have discussed with patient to change her diet to healthy diet plan and to start regular physical exercise to lose weight.  We also discussed to take omega-3 1200 mg twice daily with meal and vitamin D 400 units daily.

## 2018-06-20 NOTE — PROGRESS NOTES
Subjective:   Karolyn Hurley is a 37 y.o. female here today for evaluation and management of:      Slow transit constipation  Patient reported that she still has constipation and abdominal bloating.  She has not tried Metamucil or any stool softeners yet.  She states that she does not breast-feeding her baby.  She is bottlefeeding her 4-month-old daughter currently.  We discussed to increase fiber intake by eating more vegetables and increase water intake.  I also advised patient to act on Metamucil daily.  She can have MiraLAX once a day as needed if she still does not have bowel movement from fiber supplement.    Fatty liver  Patient has elevated liver enzymes on 4/7/18.  Patient stated that she does not drink alcohol at all.  Repeated blood test on 6/14/18 showed that liver enzymes are back to normal.  She has negative viral hepatitis panel on 6/14/18.  Liver ultrasound on 5/16/18 showed hepatomegaly, fatty liver, fibrosis not excluded, gallbladder sludge and stone, no dilation of common bile duct, no free fluid.  I have discussed with patient to change her diet to healthy diet plan and to start regular physical exercise to lose weight.  We also discussed to take omega-3 1200 mg twice daily with meal and vitamin D 400 units daily.    Epigastric pain  Patient reported that her epigastric pain improved after decreasing the amount of the food that she eats.  She does not have nausea or vomiting or fever or abdominal pain currently.  She has negative H pylori stool antigen.  Ultrasound on 5/16/18 showed gallstones but no inflammation of gallbladder or no biliary tree obstruction.    Acute pharyngitis due to other specified organisms  Patient reports that she has sore throat, fever, chills last week.  She has elevated total white blood cell count and neutrophil count and recent blood test done in last week.  Patient reported sick contacts at home.  She also noticed swollen gland on left side of the neck and  "painful on touching.         Current medicines (including changes today)  Current Outpatient Prescriptions   Medication Sig Dispense Refill   • acetaminophen (TYLENOL) 325 MG Tab Take 650 mg by mouth every four hours as needed.     • amoxicillin (AMOXIL) 875 MG tablet Take 1 Tab by mouth 2 times a day for 7 days. 14 Tab 0   • Omega 3 1200 MG Cap Take  by mouth 2 times a day, with meals.     • vitamin e (VITAMIN E) 400 UNIT Cap Take 400 Units by mouth every day.     • prenatal plus vitamin (STUARTNATAL 1+1) 27-1 MG Tab tablet Take 1 Tab by mouth every morning. 30 Tab 11     No current facility-administered medications for this visit.      She  has no past medical history of Asthma; Congestive heart failure (HCC); Diabetes (HCC); or Hypertension.    ROS   No chest pain, no shortness of breath, no abdominal pain       Objective:     Blood pressure 112/72, pulse 89, temperature 36.9 °C (98.4 °F), height 1.626 m (5' 4\"), weight 73.5 kg (162 lb), SpO2 98 %, currently breastfeeding. Body mass index is 27.81 kg/m².   Physical Exam:  General: Alert, oriented and no acute distress.  Eye contact is good, speech goal directed, affect calm  HEENT: conjunctiva non-injected, sclera non-icteric.  Oral mucous membranes pink and moist with no lesions.  Pinna normal.   Lungs: Normal respiratory effort, clear to auscultation bilaterally with good excursion.  CV: regular rate and rhythm. No murmurs.   Abdomen: soft, non distended, nontender, Bowel sound normal.  Ext: no edema, color normal, vascularity normal, temperature normal         Assessment and Plan:   The following treatment plan was discussed     1. Acute pharyngitis due to other specified organisms  - Prescribed amoxicillin 875 mg 1 tablets twice a day for 7 days.  Review potential side effects of amoxicillin with patients.  Advised to take probiotic 1 capsule daily.  Recommend to gargle throat with warm salt water twice a day.  Patient has reactive lymphadenitis on left " cervical gland.  Patient may take Tylenol as needed for pain.  - Patient is advised to return to clinic if her symptoms do not improve.   - amoxicillin (AMOXIL) 875 MG tablet; Take 1 Tab by mouth 2 times a day for 7 days.  Dispense: 14 Tab; Refill: 0  - CBC WITH DIFFERENTIAL; Future  - COMP METABOLIC PANEL; Future    2. Fatty liver  - We have a long discussion of healthy diet such as low-fat and low carbohydrate diet.  I advised patient to avoid eating cheese, mayonnaise, ranch, fried food, red meat.  Patient states that she does not drink alcohol at all.  I also advised patient to have regular physical exercise 3-5 times a week and at least 45 minutes of cardio exercise each time.  Encourage patient to lose weight.  - Advised patient to take omega-3 1200 mg twice a day with meals and vitamin E 400 units daily.  - COMP METABOLIC PANEL; Future  - LIPID PROFILE; Future  - US-LIVER AND BILIARY TREE; Future  - Omega 3 1200 MG Cap; Take  by mouth 2 times a day, with meals.  - vitamin e (VITAMIN E) 400 UNIT Cap; Take 400 Units by mouth every day.    3. Slow transit constipation  - Patient is advised to eat  more vegetables and increase water intake.  Patient will try Metamucil daily.  If she still does not have bowel movement daily, she will add on MiraLAX.  Patient does not breast-feeding her baby anymore.  - CBC WITH DIFFERENTIAL; Future  - COMP METABOLIC PANEL; Future    4. Epigastric pain  - Improved.  Reviewed ultrasound report with patient.  Patient does not have any acute cholecystitis currently.  Advised patient to avoid fatty food.  If she has recurrent symptoms or any signs or symptoms of gallbladder inflammation, patient needs to see surgeon for further evaluation for cholecystectomy.  - Patient has negative H pylori stool antigen test.    5. Health Maintenance   - Patient has Pap smear with Dr. Hannah, gynecologist last year.  We will request Pap smear report from gynecologist.    Followup: Return in about 6  months (around 12/19/2018) for Fatty liver, constipation, lab review.      Please note that this dictation was created using voice recognition software. I have made every reasonable attempt to correct obvious errors, but I expect that there may have unintended errors in text, spelling, punctuation, or grammar that I did not discover.

## 2018-10-09 ENCOUNTER — APPOINTMENT (OUTPATIENT)
Dept: MEDICAL GROUP | Age: 38
End: 2018-10-09
Payer: COMMERCIAL

## 2018-10-31 ENCOUNTER — NON-PROVIDER VISIT (OUTPATIENT)
Dept: OCCUPATIONAL MEDICINE | Facility: CLINIC | Age: 38
End: 2018-10-31

## 2018-10-31 DIAGNOSIS — Z02.1 PRE-EMPLOYMENT DRUG SCREENING: ICD-10-CM

## 2018-10-31 PROCEDURE — 80305 DRUG TEST PRSMV DIR OPT OBS: CPT | Performed by: INTERNAL MEDICINE

## 2018-11-01 LAB
AMP AMPHETAMINE: NORMAL
COC COCAINE: NORMAL
INT CON NEG: NORMAL
INT CON POS: NORMAL
MET METHAMPHETAMINES: NORMAL
OPI OPIATES: NORMAL
PCP PHENCYCLIDINE: NORMAL
POC DRUG COMMENT 753798-OCCUPATIONAL HEALTH: NORMAL
THC: NORMAL

## 2018-11-12 ENCOUNTER — APPOINTMENT (OUTPATIENT)
Dept: MEDICAL GROUP | Age: 38
End: 2018-11-12
Payer: COMMERCIAL

## 2018-11-15 ENCOUNTER — APPOINTMENT (OUTPATIENT)
Dept: MEDICAL GROUP | Age: 38
End: 2018-11-15
Payer: COMMERCIAL

## 2018-11-20 ENCOUNTER — HOSPITAL ENCOUNTER (OUTPATIENT)
Dept: RADIOLOGY | Facility: MEDICAL CENTER | Age: 38
End: 2018-11-20
Attending: INTERNAL MEDICINE
Payer: COMMERCIAL

## 2018-11-20 DIAGNOSIS — K76.0 FATTY LIVER: ICD-10-CM

## 2018-11-20 PROCEDURE — 76705 ECHO EXAM OF ABDOMEN: CPT

## 2018-12-13 ENCOUNTER — HOSPITAL ENCOUNTER (OUTPATIENT)
Dept: LAB | Facility: MEDICAL CENTER | Age: 38
End: 2018-12-13
Attending: INTERNAL MEDICINE
Payer: COMMERCIAL

## 2018-12-13 DIAGNOSIS — K59.01 SLOW TRANSIT CONSTIPATION: ICD-10-CM

## 2018-12-13 DIAGNOSIS — J02.8 ACUTE PHARYNGITIS DUE TO OTHER SPECIFIED ORGANISMS: ICD-10-CM

## 2018-12-13 DIAGNOSIS — K76.0 FATTY LIVER: ICD-10-CM

## 2018-12-13 LAB
ALBUMIN SERPL BCP-MCNC: 4 G/DL (ref 3.2–4.9)
ALBUMIN/GLOB SERPL: 1.3 G/DL
ALP SERPL-CCNC: 59 U/L (ref 30–99)
ALT SERPL-CCNC: 10 U/L (ref 2–50)
ANION GAP SERPL CALC-SCNC: 6 MMOL/L (ref 0–11.9)
AST SERPL-CCNC: 15 U/L (ref 12–45)
BASOPHILS # BLD AUTO: 0.4 % (ref 0–1.8)
BASOPHILS # BLD: 0.03 K/UL (ref 0–0.12)
BILIRUB SERPL-MCNC: 0.5 MG/DL (ref 0.1–1.5)
BUN SERPL-MCNC: 14 MG/DL (ref 8–22)
CALCIUM SERPL-MCNC: 9.5 MG/DL (ref 8.5–10.5)
CHLORIDE SERPL-SCNC: 107 MMOL/L (ref 96–112)
CHOLEST SERPL-MCNC: 162 MG/DL (ref 100–199)
CO2 SERPL-SCNC: 24 MMOL/L (ref 20–33)
CREAT SERPL-MCNC: 0.6 MG/DL (ref 0.5–1.4)
EOSINOPHIL # BLD AUTO: 0.1 K/UL (ref 0–0.51)
EOSINOPHIL NFR BLD: 1.5 % (ref 0–6.9)
ERYTHROCYTE [DISTWIDTH] IN BLOOD BY AUTOMATED COUNT: 46 FL (ref 35.9–50)
GLOBULIN SER CALC-MCNC: 3.2 G/DL (ref 1.9–3.5)
GLUCOSE SERPL-MCNC: 91 MG/DL (ref 65–99)
HCT VFR BLD AUTO: 39 % (ref 37–47)
HDLC SERPL-MCNC: 56 MG/DL
HGB BLD-MCNC: 12.2 G/DL (ref 12–16)
IMM GRANULOCYTES # BLD AUTO: 0.01 K/UL (ref 0–0.11)
IMM GRANULOCYTES NFR BLD AUTO: 0.1 % (ref 0–0.9)
LDLC SERPL CALC-MCNC: 88 MG/DL
LYMPHOCYTES # BLD AUTO: 2.06 K/UL (ref 1–4.8)
LYMPHOCYTES NFR BLD: 30.3 % (ref 22–41)
MCH RBC QN AUTO: 26.9 PG (ref 27–33)
MCHC RBC AUTO-ENTMCNC: 31.3 G/DL (ref 33.6–35)
MCV RBC AUTO: 86.1 FL (ref 81.4–97.8)
MONOCYTES # BLD AUTO: 0.35 K/UL (ref 0–0.85)
MONOCYTES NFR BLD AUTO: 5.1 % (ref 0–13.4)
NEUTROPHILS # BLD AUTO: 4.25 K/UL (ref 2–7.15)
NEUTROPHILS NFR BLD: 62.6 % (ref 44–72)
NRBC # BLD AUTO: 0 K/UL
NRBC BLD-RTO: 0 /100 WBC
PLATELET # BLD AUTO: 254 K/UL (ref 164–446)
PMV BLD AUTO: 11 FL (ref 9–12.9)
POTASSIUM SERPL-SCNC: 4.1 MMOL/L (ref 3.6–5.5)
PROT SERPL-MCNC: 7.2 G/DL (ref 6–8.2)
RBC # BLD AUTO: 4.53 M/UL (ref 4.2–5.4)
SODIUM SERPL-SCNC: 137 MMOL/L (ref 135–145)
TRIGL SERPL-MCNC: 88 MG/DL (ref 0–149)
WBC # BLD AUTO: 6.8 K/UL (ref 4.8–10.8)

## 2018-12-13 PROCEDURE — 36415 COLL VENOUS BLD VENIPUNCTURE: CPT

## 2018-12-13 PROCEDURE — 85025 COMPLETE CBC W/AUTO DIFF WBC: CPT

## 2018-12-13 PROCEDURE — 80053 COMPREHEN METABOLIC PANEL: CPT

## 2018-12-13 PROCEDURE — 80061 LIPID PANEL: CPT

## 2018-12-17 ENCOUNTER — OFFICE VISIT (OUTPATIENT)
Dept: MEDICAL GROUP | Age: 38
End: 2018-12-17
Payer: COMMERCIAL

## 2018-12-17 VITALS
HEART RATE: 78 BPM | TEMPERATURE: 97.9 F | BODY MASS INDEX: 24.24 KG/M2 | SYSTOLIC BLOOD PRESSURE: 100 MMHG | HEIGHT: 64 IN | DIASTOLIC BLOOD PRESSURE: 64 MMHG | OXYGEN SATURATION: 98 % | WEIGHT: 142 LBS

## 2018-12-17 DIAGNOSIS — K59.01 SLOW TRANSIT CONSTIPATION: ICD-10-CM

## 2018-12-17 DIAGNOSIS — Z00.00 ROUTINE GENERAL MEDICAL EXAMINATION AT A HEALTH CARE FACILITY: ICD-10-CM

## 2018-12-17 DIAGNOSIS — Z23 NEED FOR VACCINATION: ICD-10-CM

## 2018-12-17 DIAGNOSIS — Z12.31 VISIT FOR SCREENING MAMMOGRAM: ICD-10-CM

## 2018-12-17 DIAGNOSIS — K76.0 FATTY LIVER: ICD-10-CM

## 2018-12-17 PROBLEM — J02.8 ACUTE PHARYNGITIS DUE TO OTHER SPECIFIED ORGANISMS: Status: RESOLVED | Noted: 2018-06-19 | Resolved: 2018-12-17

## 2018-12-17 PROBLEM — R10.13 EPIGASTRIC PAIN: Status: RESOLVED | Noted: 2018-05-02 | Resolved: 2018-12-17

## 2018-12-17 PROCEDURE — 90471 IMMUNIZATION ADMIN: CPT | Performed by: INTERNAL MEDICINE

## 2018-12-17 PROCEDURE — 99396 PREV VISIT EST AGE 40-64: CPT | Mod: 25 | Performed by: INTERNAL MEDICINE

## 2018-12-17 PROCEDURE — 90686 IIV4 VACC NO PRSV 0.5 ML IM: CPT | Performed by: INTERNAL MEDICINE

## 2018-12-18 NOTE — PROGRESS NOTES
Chief Complaint:     Karolyn Hurley is a 38 y.o. female who presents for annual exam.  Patient speaks Syriac and this encounter was translated by patient's .    Pt has GYN provider: yes  Last colonoscopy: Not indicated yet  Bone density test:N\A  Gardasil:N\A   Last Td: 4/22/14  Regular exercise: yes     Routine general medical examination at a health care facility  Patient presented to clinic with her  for physical exam and lab review.  Her  is the  for this encounter.  Patient stated that she is doing well generally and is losing weight from eating less and doing physical exercise.     Slow transit constipation  She still has constipation intermittently with abdominal bloating.  She stated that she takes prune juice once or twice a week for bowel movement.  She was advised to try MiraLAX and Metamucil in the past but she did not take it regularly.    Fatty liver  Patient has history of elevated liver enzymes on 4/7/18.  Her liver enzymes back to normal.  She denies drinking alcohol.  She has negative viral hepatitis panel on 6/14/18.  Patient has developed ultrasound on 5/16/18 showed hepatomegaly, fatty liver, gallbladder/and stone.  Repeated liver ultrasound on 11/20/18 showed fatty liver and hepatomegaly improved and liver decrease in size from 17.8 cm to 15.3 cm in recent ultrasound, she has gall stones or sludge in gallbladder.  Patient reported having stomach discomfort after eating meals.  However she did not feel any significant abdominal pain or nausea or vomiting or jaundice or anorexia.  I discussed with patient for gallstone treatment and she does not have any inflammation in her gallbladder currently.  Patient and  agreed to postpone surgical referral.  She is advised to eat low-fat diet and advised to try to improve constipation by eating more fiber.        She  has no past medical history of Asthma; Congestive heart failure (HCC); Diabetes (HCC); or  Hypertension.  She  has no past surgical history on file.  Current Outpatient Prescriptions   Medication Sig Dispense Refill   • Omega 3 1200 MG Cap Take  by mouth 2 times a day, with meals.     • vitamin e (VITAMIN E) 400 UNIT Cap Take 400 Units by mouth every day.       No current facility-administered medications for this visit.      Social History   Substance Use Topics   • Smoking status: Never Smoker   • Smokeless tobacco: Never Used   • Alcohol use No       Review Of Systems  Denies fever, chills, or sweats, unexplained weight changes  Skin: negative for rash, changing moles, abnormal pigmentation, hair or nail changes.  Eyes: negative for visual blurring, double vision, eye pain, floaters and discharge from eyes  Ears/Nose/Throat: negative for tinnitus, vertigo, oral or dental problem, hoarseness, frequent URI's, sinus trouble, persistent sore throat  Respiratory: negative for persistent cough, hemoptysis, dyspnea, wheezing  Cardiovascular: negative for palpitations, tachycardia, irregular heart beat, chest pain or pressure or peripheral edema.  Breast: Denies breast tenderness, mass,  changes in size or contour, or abnormal cyclic discomfort.  Gastrointestinal: negative for dysphagia or odynophagia, nausea, heartburn or reflux, abdominal pain, hemorrhoids, constipation or diarrhea, black stool or bloody stool  Genitourinary: negative for nocturia, dysuria, frequency, incontinence, abnormal vaginal discharge, dysparunia or abnormal vaginal bleeding  Musculoskeletal: negative for joint swelling and muscle pain/ soreness  Neurologic: negative for new or changing headaches, new weakness tremor  Psychiatric: negative for mood or sleep disturbance, anxiety, depression, sexual difficulties  Hematologic/Lymphatic/Immunologic: negative for pallor, unusual bruising, swollen glands, HIV risk factors  Endocrine: negative for temperature intolerance, polydipsia, polyuria.       PHYSICAL EXAMINATION:  Blood pressure  "100/64, pulse 78, temperature 36.6 °C (97.9 °F), temperature source Temporal, height 1.626 m (5' 4\"), weight 64.4 kg (142 lb), last menstrual period 12/09/2018, SpO2 98 %, not currently breastfeeding.  Body mass index is 24.37 kg/m².  Wt Readings from Last 4 Encounters:   12/17/18 64.4 kg (142 lb)   06/19/18 73.5 kg (162 lb)   05/02/18 74.8 kg (165 lb)   04/07/18 74.8 kg (165 lb)       Constitutional: Alert, no distress.  Skin: Warm, dry, good turgor, no rashes or suspicious lesions in visible areas.  Eye: pupils equal, round and reactive to light, conjunctivae clear, lids normal.  ENMT: Lips without lesions, good dentition, oropharynx clear. Pinnae skin normal with no lesions. TM pearly gray with normal light reflex.   Neck: supple, no masses. No anterior or supraclavicular adenopathy. No carotid bruits no thyromegaly.  Respiratory: Unlabored respiratory effort, lungs clear to auscultation, no wheezes, no ronchi.  Cardiovascular: Normal S1, S2, no murmur, no peripheral edema.  Abdomen: no CVAT abdomen Soft, non-tender, no masses, no hepatosplenomegaly.  Psych: Alert and oriented x3, normal affect and mood.  Musc/Skel: 5/5 strength and normal motion UE and LE proximal and distal.        ASSESSMENT/PLAN:  1. Routine general medical examination at a health care facility  CBC WITH DIFFERENTIAL    COMP METABOLIC PANEL    Lipid Profile    Discussed healthy diet plan and regular physical exercise to lose weight and keep healthy body weight.  She is advised to increase water intake as well.     2. Fatty liver      Advised patient to take omega-3 1200 mg twice daily with meals and vitamin E 400 units daily.  Patient does not drink alcohol.  Advised to eat low-fat and high-fiber diet.  I reviewed ultrasound report and blood test result with patient and  in clinic today.  Recommend to avoid liver offending agents.  I also advised patient to try ranitidine 75 mg twice daily for indigestion or acidity.  I discussed with " patient to avoid eating acidic food, spicy food, dairy, fatty meal to prevent abdominal bloating.  I also printed out diet for irritable bowel syndrome for patient to try.  She agreed to not to refer to surgeon yet as she did not have any abdominal pain and no gallbladder inflammation on ultrasound.  Patient is advised to return to clinic if she has worsening symptoms or any signs or symptoms of gallbladder inflammation.  She and her  understand and agree with the plan.     3. Slow transit constipation      She does not want to try MiraLAX or Metamucil.  She wants to continue prune juice.  I advised patient to increase water intake and fiber intake and eat more vegetables.      4. Need for vaccination  Influenza Vaccine Quad Injection >3Y (PF)    Influenza vaccine was given today after reviewing risks and benefits as well as side effects of vaccine.     5. Visit for screening mammogram  MA-SCREEN MAMMO W/CAD-BILAT    Counseling for breast cancer screening.  Ordered screening mammogram.       HCM: Patient is due for Pap smear and mammogram.  Order mammogram for patient today.  She was scheduled to be seen in clinic next month for Pap smear.  Labs ordered  Immunizations per orders  Pt counseled about skin care, diet, supplements, and exercise.  Next office visit for recheck of chronic medical conditions is due in 1 year.

## 2018-12-18 NOTE — ASSESSMENT & PLAN NOTE
Patient has history of elevated liver enzymes on 4/7/18.  Her liver enzymes back to normal.  She denies drinking alcohol.  She has negative viral hepatitis panel on 6/14/18.  Patient has developed ultrasound on 5/16/18 showed hepatomegaly, fatty liver, gallbladder/and stone.  Repeated liver ultrasound on 11/20/18 showed fatty liver and hepatomegaly improved and liver decrease in size from 17.8 cm to 15.3 cm in recent ultrasound, she has gall stones or sludge in gallbladder.  Patient reported having stomach discomfort after eating meals.  However she did not feel any significant abdominal pain or nausea or vomiting or jaundice or anorexia.  I discussed with patient for gallstone treatment and she does not have any inflammation in her gallbladder currently.  Patient and  agreed to postpone surgical referral.  She is advised to eat low-fat diet and advised to try to improve constipation by eating more fiber.

## 2018-12-18 NOTE — ASSESSMENT & PLAN NOTE
She still has constipation intermittently with abdominal bloating.  She stated that she takes prune juice once or twice a week for bowel movement.  She was advised to try MiraLAX and Metamucil in the past but she did not take it regularly.

## 2018-12-18 NOTE — ASSESSMENT & PLAN NOTE
Patient presented to clinic with her  for physical exam and lab review.  Her  is the  for this encounter.  Patient stated that she is doing well generally and is losing weight from eating less and doing physical exercise.

## 2018-12-18 NOTE — PATIENT INSTRUCTIONS
Colelitiasis  (Cholelithiasis)  La colelitiasis (también llamada cálculos en la vesícula) es mayra enfermedad de la vesícula. La vesícula es un órgano pequeño que ayuda a digerir las grasas. Los síntomas de cálculos en la vesícula son:  · Ganas de vomitar (náuseas).  · Devolver la comida (vomitar).  · Dolor en el vientre.  · Piel amarilla (ictericia)  · Dolor súbito. Podrá sentir el dolor ce algunos minutos o unas horas.  · Fiebre.  · Dolor a la palpación.  CUIDADOS EN EL HOGAR  · [place] sólo los medicamentos según le haya indicado el médico.  · Siga mayra dieta baja en grasas hasta que vuelva a rin al médico nuevamente. Consumir grasas puede causar dolor.  · Concurra a las consultas de control con el médico, según las indicaciones. Los ataques generalmente ocurren repetidas veces. Generalmente se requiere de mayra cirugía batool tratamiento permanente.  SOLICITE AYUDA DE INMEDIATO SI:  · El dolor empeora.  · El dolor no se shilpi con los medicamentos.  · Tiene fiebre o síntomas que persisten ce más de 2-3 jacky.  · Tiene fiebre y los síntomas empeoran repentinamente.  · Siente náuseas y vomita.  ASEGÚRESE DE QUE:  · Comprende estas instrucciones.  · Controlará wayne afección.  · Recibirá ayuda de inmediato si no mejora o si empeora.  Esta información no tiene batool fin reemplazar el consejo del médico. Asegúrese de hacerle al médico cualquier pregunta que tenga.  Document Released: 03/16/2010 Document Revised: 08/20/2014 Document Reviewed: 06/11/2014  Elsevier Interactive Patient Education © 2017 Elsevier Inc.    Dieta para el síndrome del intestino irritable  (Diet for Irritable Bowel Syndrome)  Cuando se tiene síndrome del intestino irritable (SII), los alimentos que se consumen y los hábitos en cuanto a la alimentación son muy importantes. El síndrome del intestino irritable puede causar diferentes síntomas, batool dolor abdominal, estreñimiento o diarrea. Elegir los alimentos adecuados puede ayudar a aliviar las  molestias causadas por el síndrome del intestino irritable. Trabaje con el médico y el nutricionista para encontrar el mejor plan de alimentación que ayude a controlar los síntomas.  ¿QUÉ PAUTAS GENERALES NEETU SEGUIR?  · Lleve un registro de alimentos. [place] ayudará a identificar los alimentos que causan síntomas. Escriba los siguientes datos:  ¨ Lo que comió y cuándo.  ¨ Los síntomas que tiene.  ¨ Cuándo se presentan los síntomas en relación con las comidas.  · Evite los alimentos que le ocasionen síntomas. Hable con el nutricionista sobre otras formas de obtener los mismos nutrientes que contienen estos alimentos.  · Consuma más alimentos que contengan fibra. [place] un suplemento de fibra si se lo indicó el nutricionista.  · Coma lentamente, en un clima distendido.  · Intente consumir 5 o 6 comidas pequeñas por día. No saltee comidas.  · [place] suficiente líquido para mantener la orina taya o de color amarillo pálido.  · Pregúntele al médico si debe carlos un probiótico de venta george ce las crisis para ayudar a recuperar las bacterias intestinales que son beneficiosas.  · Si tiene cólicos intestinales o diarrea, intente consumir alimentos con bajo contenido de grasa y alto contenido de carbohidratos. Los carbohidratos son, por ejemplo, pastas, arroz, panes y cereales integrales, frutas y verduras.  · Si los productos lácteos reagudizan los síntomas, intente consumir menos cantidad. Es posible que el yogur le siente mejor que otros productos lácteos porque contiene bacterias que ayudan a la digestión.  ¿QUÉ ALIMENTOS NO SE RECOMIENDAN?  Los siguientes son algunos alimentos y bebidas que pueden empeorar los síntomas:  · Alimentos grasos, batool linda fritas.  · Productos lácteos, batool queso o helado.  · Chocolate.  · Alcohol.  · Productos que contengan cafeína, batool café.  · Bebidas gaseosas, batool refrescos.  Los artículos mencionados arriba pueden no ser mayra lista completa de las bebidas y los alimentos que se deben  evitar. Comuníquese con wayne nutricionista para obtener más información.  ¿QUÉ ALIMENTOS SON BUENAS TRINIDAD DE FIBRA?  El médico o el nutricionista pueden recomendarle que consuma más alimentos que contengan fibra. La fibra puede ayudar a aliviar el estreñimiento y otros síntomas del síndrome del intestino irritable. Poco a poco, incorpore a la dieta alimentos que contengan fibra para que el organismo pueda acostumbrarse a ellos. El exceso de fibra de mayra vez puede causar gases e hinchazón abdominal. Los siguientes son algunos alimentos que son buenas trinidad de fibra:  · Manzanas.  · Duraznos.  · Peras.  · Patti rojos.  · Higos.  · Brócoli (crudo).  · Repollo.  · Zanahorias.  · Guisantes crudos.  · Porotos colorados.  · Porotos de [place].  · Pan integral.  · Cereal integral.  PARA OBTENER MÁS INFORMACIÓN   Fundación Internacional para los Trastornos Gastrointestinales Funcionales (International Foundation for Functional Gastrointestinal Disorders): www.iffgd.org  Instituto Nacional de la Diabetes y las Enfermedades Digestivas y Renales (National [place] of Diabetes and Digestive and Kidney Diseases): www.niddk.nih.gov/health-information/health-topics/digestive-diseases/ibs/Pages/facts.aspx     Esta información no tiene batool fin reemplazar el consejo del médico. Asegúrese de hacerle al médico cualquier pregunta que tenga.     Document Released: 04/05/2010 Document Revised: 01/08/2016  Elsevier Interactive Patient Education ©2016 Elsevier Inc.

## 2018-12-29 ENCOUNTER — HOSPITAL ENCOUNTER (OUTPATIENT)
Dept: RADIOLOGY | Facility: MEDICAL CENTER | Age: 38
End: 2018-12-29
Attending: INTERNAL MEDICINE
Payer: COMMERCIAL

## 2018-12-29 DIAGNOSIS — Z12.31 VISIT FOR SCREENING MAMMOGRAM: ICD-10-CM

## 2018-12-29 PROCEDURE — 77067 SCR MAMMO BI INCL CAD: CPT

## 2019-01-15 ENCOUNTER — OFFICE VISIT (OUTPATIENT)
Dept: MEDICAL GROUP | Age: 39
End: 2019-01-15
Payer: COMMERCIAL

## 2019-01-15 ENCOUNTER — HOSPITAL ENCOUNTER (OUTPATIENT)
Facility: MEDICAL CENTER | Age: 39
End: 2019-01-15
Attending: INTERNAL MEDICINE
Payer: COMMERCIAL

## 2019-01-15 VITALS
HEIGHT: 64 IN | BODY MASS INDEX: 24.96 KG/M2 | SYSTOLIC BLOOD PRESSURE: 102 MMHG | DIASTOLIC BLOOD PRESSURE: 66 MMHG | TEMPERATURE: 97.3 F | OXYGEN SATURATION: 96 % | WEIGHT: 146.2 LBS | HEART RATE: 77 BPM

## 2019-01-15 DIAGNOSIS — N81.4 CYSTOCELE WITH UTERINE PROLAPSE: ICD-10-CM

## 2019-01-15 DIAGNOSIS — J20.9 ACUTE BRONCHITIS, UNSPECIFIED ORGANISM: ICD-10-CM

## 2019-01-15 DIAGNOSIS — Z12.4 SCREENING FOR CERVICAL CANCER: ICD-10-CM

## 2019-01-15 DIAGNOSIS — Z01.419 ENCOUNTER FOR GYNECOLOGICAL EXAMINATION: ICD-10-CM

## 2019-01-15 PROCEDURE — 88175 CYTOPATH C/V AUTO FLUID REDO: CPT

## 2019-01-15 PROCEDURE — 87624 HPV HI-RISK TYP POOLED RSLT: CPT

## 2019-01-15 PROCEDURE — 99214 OFFICE O/P EST MOD 30 MIN: CPT | Mod: 25 | Performed by: INTERNAL MEDICINE

## 2019-01-15 PROCEDURE — 99396 PREV VISIT EST AGE 40-64: CPT | Performed by: INTERNAL MEDICINE

## 2019-01-15 RX ORDER — METHYLPREDNISOLONE 4 MG/1
TABLET ORAL
Qty: 21 TAB | Refills: 0 | Status: SHIPPED | OUTPATIENT
Start: 2019-01-15 | End: 2019-05-04 | Stop reason: CLARIF

## 2019-01-15 RX ORDER — ALBUTEROL SULFATE 90 UG/1
2 AEROSOL, METERED RESPIRATORY (INHALATION) EVERY 6 HOURS PRN
Qty: 8.5 G | Refills: 3 | Status: SHIPPED | OUTPATIENT
Start: 2019-01-15 | End: 2019-05-04 | Stop reason: CLARIF

## 2019-01-15 RX ORDER — DOXYCYCLINE HYCLATE 100 MG
100 TABLET ORAL 2 TIMES DAILY
Qty: 14 TAB | Refills: 0 | Status: SHIPPED | OUTPATIENT
Start: 2019-01-15 | End: 2019-05-04 | Stop reason: CLARIF

## 2019-01-15 ASSESSMENT — PATIENT HEALTH QUESTIONNAIRE - PHQ9: CLINICAL INTERPRETATION OF PHQ2 SCORE: 0

## 2019-01-16 LAB
CYTOLOGY REG CYTOL: NORMAL
HPV HR 12 DNA CVX QL NAA+PROBE: NEGATIVE
HPV16 DNA SPEC QL NAA+PROBE: NEGATIVE
HPV18 DNA SPEC QL NAA+PROBE: NEGATIVE
SPECIMEN SOURCE: NORMAL

## 2019-01-16 NOTE — ASSESSMENT & PLAN NOTE
Patient has a pregnancy in the past and the last child was 10 months old.  She is not breast-feeding currently.  Patient stated that she delivered all her children with normal vaginal delivery.  Patient reported having prolapse sensation on her vagina after having the last child.  She denied bowel or bladder incontinence.  She denied painful sexual intercourse.  She has menstruation once a month and last for 5 days.  LMP was on 1/1/19.  She denies spotting or discharge.

## 2019-01-16 NOTE — PROGRESS NOTES
SUBJECTIVE: 38 y.o. female for annual routine gynecologic exam  Chief Complaint   Patient presents with   • Gynecologic Exam   • Cough     Cough, chills, chest congestion for 1 week       Obstetric History       T0      L7     SAB0   TAB0   Ectopic0   Molar0   Multiple0   Live Births7       Last Pap: Patient did not recall when she had last Pap smear.  History   Sexual Activity   • Sexual activity: Yes   • Partners: Male     Sexual history: currently sexually active, single partner   H/O Abnormal Pap unknown  She  reports that she has never smoked. She has never used smokeless tobacco.    Cystocele with uterine prolapse  Patient has a pregnancy in the past and the last child was 10 months old.  She is not breast-feeding currently.  Patient stated that she delivered all her children with normal vaginal delivery.  Patient reported having prolapse sensation on her vagina after having the last child.  She denied bowel or bladder incontinence.  She denied painful sexual intercourse.  She has menstruation once a month and last for 5 days.  LMP was on 19.  She denies spotting or discharge.    Acute bronchitis  Patient reported having worsening cough for 1 week.  She stated that she has chills and feeling cold.  She also has mild short of breath when she coughs.  She tried over-the-counter Advil twice a day for her cold symptoms in the past 5 days.  Her symptoms of chills and sore throat improved with Advil.  However she still has cough and wheezing.            Allergies: Nkda [no known drug allergy]     ROS:    Reports none menopause symptoms of hot flashes, night sweats, sleep disruption, mood changes.Denies vaginal dryness.   Menses every month with 5 days moderate bleeding   Cramping is mild.   She does take OTC analgesics for cramping  No significant bloating/fluid retention, pelvic pain, or dyspareunia. No vaginal discharge   No breast tenderness, mass, nipple discharge, changes in size or contour,  "or abnormal cyclic discomfort.  Reported feeling pressure sensation on vagina and stating that something coming out from vagina, no urinary tract symptoms, no incontinence.   No abdominal pain, change in bowel habits, black or bloody stools.    No unusual headaches, no visual changes, menstrual migraines   Positive for cough, wheezing, chest congestion. No dyspnea or chest pain on exertion.  No depression, labile mood, anxiety, libido changes, insomnia.  No polydipsia, polyuria, temperature intolerance.  No new/concerning skin lesions, concerns.     Exercise: minimal exercise  Preventive Care: Patient has normal mammogram on 12/29/18.  She received flu vaccine on 12/17/18.    Current medicines (including changes today)  Current Outpatient Prescriptions   Medication Sig Dispense Refill   • doxycycline (VIBRAMYCIN) 100 MG Tab Take 1 Tab by mouth 2 times a day. 14 Tab 0   • MethylPREDNISolone (MEDROL DOSEPAK) 4 MG Tablet Therapy Pack Take as instructed and take with food. 21 Tab 0   • albuterol 108 (90 Base) MCG/ACT Aero Soln inhalation aerosol Inhale 2 Puffs by mouth every 6 hours as needed. 8.5 g 3   • Omega 3 1200 MG Cap Take  by mouth 2 times a day, with meals.     • vitamin e (VITAMIN E) 400 UNIT Cap Take 400 Units by mouth every day.       No current facility-administered medications for this visit.      She  has no past medical history of Asthma; Congestive heart failure (HCC); Diabetes (HCC); or Hypertension.  She  has no past surgical history on file.     Family History:   Family History   Problem Relation Age of Onset   • No Known Problems Mother    • No Known Problems Father    • Diabetes Neg Hx    • Cancer Neg Hx    • Heart Disease Neg Hx    • Hypertension Neg Hx    • Hyperlipidemia Neg Hx    • Stroke Neg Hx           OBJECTIVE:   /66 (BP Location: Right arm, Patient Position: Sitting, BP Cuff Size: Adult)   Pulse 77   Temp 36.3 °C (97.3 °F) (Temporal)   Ht 1.626 m (5' 4\")   Wt 66.3 kg (146 lb 3.2 " oz)   LMP 01/01/2019   SpO2 96%   Breastfeeding? No   BMI 25.10 kg/m²   Body mass index is 25.1 kg/m².    HEAD AND NECK:  Ears normal.  Throat, oral cavity and tongue normal.  Neck supple. No adenopathy or masses in the neck or supraclavicular regions.  No carotid bruits. No thyromegaly. NEURO: Cranial nerves are normal. DTR's normal and symmetric.  CHEST:  Clear, expiratory wheezes on both lungs. HEART:  Regular rate and rhythm.  S1 and S2 normal.  No edema or JVD. ABDOMEN:  Soft without tenderness, guarding, mass or organomegaly.  No CVA tenderness or inguinal adenopathy. EXTREMITIES:  Extremities, reflexes and peripheral pulses are normal. SKIN: color normal, vascularity normal, no edema, temperature normal   No rashes or suspicious skin lesions noted.     Breast Exam: Performed with instruction during examination. No axillary lymphadenopathy, no skin changes, no dominant masses. No nipple retraction  Pelvic Exam -  Normal external genitalia with no lesions. Normal vaginal mucosa with normal rugation and scant discharge.  Cystocele with uterine prolapse was noticed on exam.  Cervix with mild inflammation and bloody discharge. No cervical motion tenderness. Uterus is normal sized with no masses. No adnexal tenderness or enlargement appreciated. Thin Prep Pap is obtained, vaginal swab is not obtained and specimen(s) sent to lab  Rectal: deferred    <ASSESSMENT and PLAN>  1. Encounter for gynecological examination      Counseling for healthy diet, physical exercise, self breast exam once a month, annual screening mammogram and Kegel's exercise.     2. Screening for cervical cancer  THINPREP PAP WITH HPV    Obtained Pap smear and sent to lab.  Will advise for result.  Difficulty to obtain Pap smear due to cystocele and prolapsed uterus.  Patient has mild inflammation sign on cervical mucosa and it is easy to bleed as she is taking Advil, omega-3, vitamin E daily.  I discussed with patient if her Pap smear report  showed that the cell is not adequate for pathology report, she needs to repeat the Pap smear with gynecologist again.  Patient agreed with the plan.     3. Cystocele with uterine prolapse  REFERRAL TO GYNECOLOGY    Patient is advised to do Kegle's exercise regularly.  Referred to gynecologist for further evaluation and treatment.     4. Acute bronchitis, unspecified organism  doxycycline (VIBRAMYCIN) 100 MG Tab    MethylPREDNISolone (MEDROL DOSEPAK) 4 MG Tablet Therapy Pack    albuterol 108 (90 Base) MCG/ACT Aero Soln inhalation aerosol    Prescribed doxycycline to take 100 mg 1 capsule twice a day for 7 days, Medrol Dosepak to take as instructed and take with food, albuterol inhaler 2 puffs every 6 hours as needed.  I reviewed potential side effects of doxycycline, Medrol Dosepak and albuterol inhaler with patient.  I advised patient to gargle her throat with warm salt water 2-3 times a day and increase warm liquid and warm water intake.  Discussed ED precautions with patient: seek emergency evaluation for symptoms including but not limited to: worsening symptoms or developing any new symptoms, crushing chest pain, chest pain associated with difficulty breathing, nausea, or sweats, heart rate irregular or too fast to count.   Any change or worsening of signs or symptoms, patient encouraged to follow-up or report to emergency room for further evaluation. Patient understands and agrees         Discussed  breast self exam, family planning choices, adequate intake of calcium and vitamin D, diet and exercise, Kegel's exercises   Follow-up in 1 years for next Gyn exam and 3 years for next Pap.   Next office visit for recheck of chronic medical conditions is due in 9 months.  Patient is advised to return to clinic sooner if her symptoms do not improve with above treatment.

## 2019-01-16 NOTE — ASSESSMENT & PLAN NOTE
Patient reported having worsening cough for 1 week.  She stated that she has chills and feeling cold.  She also has mild short of breath when she coughs.  She tried over-the-counter Advil twice a day for her cold symptoms in the past 5 days.  Her symptoms of chills and sore throat improved with Advil.  However she still has cough and wheezing.

## 2019-01-31 ENCOUNTER — APPOINTMENT (OUTPATIENT)
Dept: MEDICAL GROUP | Age: 39
End: 2019-01-31
Payer: COMMERCIAL

## 2019-05-04 ENCOUNTER — ANESTHESIA (OUTPATIENT)
Dept: SURGERY | Facility: MEDICAL CENTER | Age: 39
End: 2019-05-04
Payer: COMMERCIAL

## 2019-05-04 ENCOUNTER — HOSPITAL ENCOUNTER (EMERGENCY)
Facility: MEDICAL CENTER | Age: 39
End: 2019-05-04
Attending: EMERGENCY MEDICINE
Payer: COMMERCIAL

## 2019-05-04 ENCOUNTER — ANESTHESIA EVENT (OUTPATIENT)
Dept: SURGERY | Facility: MEDICAL CENTER | Age: 39
End: 2019-05-04
Payer: COMMERCIAL

## 2019-05-04 ENCOUNTER — APPOINTMENT (OUTPATIENT)
Dept: RADIOLOGY | Facility: MEDICAL CENTER | Age: 39
End: 2019-05-04
Attending: EMERGENCY MEDICINE
Payer: COMMERCIAL

## 2019-05-04 VITALS
DIASTOLIC BLOOD PRESSURE: 56 MMHG | RESPIRATION RATE: 19 BRPM | WEIGHT: 150.79 LBS | HEART RATE: 87 BPM | OXYGEN SATURATION: 100 % | TEMPERATURE: 97.3 F | BODY MASS INDEX: 25.74 KG/M2 | HEIGHT: 64 IN | SYSTOLIC BLOOD PRESSURE: 101 MMHG

## 2019-05-04 DIAGNOSIS — O03.9 MISCARRIAGE: ICD-10-CM

## 2019-05-04 DIAGNOSIS — O03.9 SAB (SPONTANEOUS ABORTION): ICD-10-CM

## 2019-05-04 PROBLEM — O24.419 GESTATIONAL DIABETES: Status: ACTIVE | Noted: 2019-05-04

## 2019-05-04 LAB
ABO GROUP BLD: NORMAL
ANION GAP SERPL CALC-SCNC: 9 MMOL/L (ref 0–11.9)
APPEARANCE UR: CLEAR
B-HCG SERPL-ACNC: 7762.5 MIU/ML (ref 0–5)
BACTERIA #/AREA URNS HPF: NEGATIVE /HPF
BASOPHILS # BLD AUTO: 0.3 % (ref 0–1.8)
BASOPHILS # BLD AUTO: 0.4 % (ref 0–1.8)
BASOPHILS # BLD: 0.03 K/UL (ref 0–0.12)
BASOPHILS # BLD: 0.06 K/UL (ref 0–0.12)
BILIRUB UR QL STRIP.AUTO: NEGATIVE
BLD GP AB SCN SERPL QL: NORMAL
BUN SERPL-MCNC: 10 MG/DL (ref 8–22)
CALCIUM SERPL-MCNC: 8.7 MG/DL (ref 8.5–10.5)
CHLORIDE SERPL-SCNC: 106 MMOL/L (ref 96–112)
CO2 SERPL-SCNC: 22 MMOL/L (ref 20–33)
COLOR UR: YELLOW
CREAT SERPL-MCNC: 0.57 MG/DL (ref 0.5–1.4)
EOSINOPHIL # BLD AUTO: 0.02 K/UL (ref 0–0.51)
EOSINOPHIL # BLD AUTO: 0.09 K/UL (ref 0–0.51)
EOSINOPHIL NFR BLD: 0.1 % (ref 0–6.9)
EOSINOPHIL NFR BLD: 0.9 % (ref 0–6.9)
EPI CELLS #/AREA URNS HPF: ABNORMAL /HPF
ERYTHROCYTE [DISTWIDTH] IN BLOOD BY AUTOMATED COUNT: 44.7 FL (ref 35.9–50)
ERYTHROCYTE [DISTWIDTH] IN BLOOD BY AUTOMATED COUNT: 45.5 FL (ref 35.9–50)
GLUCOSE SERPL-MCNC: 108 MG/DL (ref 65–99)
GLUCOSE UR STRIP.AUTO-MCNC: NEGATIVE MG/DL
HCG SERPL QL: POSITIVE
HCT VFR BLD AUTO: 32.9 % (ref 37–47)
HCT VFR BLD AUTO: 38.7 % (ref 37–47)
HGB BLD-MCNC: 10.4 G/DL (ref 12–16)
HGB BLD-MCNC: 12.8 G/DL (ref 12–16)
HYALINE CASTS #/AREA URNS LPF: ABNORMAL /LPF
IMM GRANULOCYTES # BLD AUTO: 0.03 K/UL (ref 0–0.11)
IMM GRANULOCYTES # BLD AUTO: 0.07 K/UL (ref 0–0.11)
IMM GRANULOCYTES NFR BLD AUTO: 0.3 % (ref 0–0.9)
IMM GRANULOCYTES NFR BLD AUTO: 0.5 % (ref 0–0.9)
KETONES UR STRIP.AUTO-MCNC: NEGATIVE MG/DL
LEUKOCYTE ESTERASE UR QL STRIP.AUTO: NEGATIVE
LYMPHOCYTES # BLD AUTO: 1.72 K/UL (ref 1–4.8)
LYMPHOCYTES # BLD AUTO: 2.84 K/UL (ref 1–4.8)
LYMPHOCYTES NFR BLD: 11.1 % (ref 22–41)
LYMPHOCYTES NFR BLD: 29 % (ref 22–41)
MCH RBC QN AUTO: 27.2 PG (ref 27–33)
MCH RBC QN AUTO: 28.1 PG (ref 27–33)
MCHC RBC AUTO-ENTMCNC: 31.6 G/DL (ref 33.6–35)
MCHC RBC AUTO-ENTMCNC: 33.1 G/DL (ref 33.6–35)
MCV RBC AUTO: 85.1 FL (ref 81.4–97.8)
MCV RBC AUTO: 85.9 FL (ref 81.4–97.8)
MICRO URNS: ABNORMAL
MONOCYTES # BLD AUTO: 0.46 K/UL (ref 0–0.85)
MONOCYTES # BLD AUTO: 0.5 K/UL (ref 0–0.85)
MONOCYTES NFR BLD AUTO: 3 % (ref 0–13.4)
MONOCYTES NFR BLD AUTO: 5.1 % (ref 0–13.4)
NEUTROPHILS # BLD AUTO: 13.12 K/UL (ref 2–7.15)
NEUTROPHILS # BLD AUTO: 6.31 K/UL (ref 2–7.15)
NEUTROPHILS NFR BLD: 64.4 % (ref 44–72)
NEUTROPHILS NFR BLD: 84.9 % (ref 44–72)
NITRITE UR QL STRIP.AUTO: NEGATIVE
NRBC # BLD AUTO: 0 K/UL
NRBC # BLD AUTO: 0 K/UL
NRBC BLD-RTO: 0 /100 WBC
NRBC BLD-RTO: 0 /100 WBC
PH UR STRIP.AUTO: 6 [PH]
PLATELET # BLD AUTO: 208 K/UL (ref 164–446)
PLATELET # BLD AUTO: 216 K/UL (ref 164–446)
PMV BLD AUTO: 10.6 FL (ref 9–12.9)
PMV BLD AUTO: 10.7 FL (ref 9–12.9)
POTASSIUM SERPL-SCNC: 3.4 MMOL/L (ref 3.6–5.5)
PROT UR QL STRIP: NEGATIVE MG/DL
RBC # BLD AUTO: 3.83 M/UL (ref 4.2–5.4)
RBC # BLD AUTO: 4.55 M/UL (ref 4.2–5.4)
RBC # URNS HPF: ABNORMAL /HPF
RBC UR QL AUTO: ABNORMAL
RH BLD: NORMAL
SODIUM SERPL-SCNC: 137 MMOL/L (ref 135–145)
SP GR UR STRIP.AUTO: 1.01
UROBILINOGEN UR STRIP.AUTO-MCNC: 0.2 MG/DL
WBC # BLD AUTO: 15.5 K/UL (ref 4.8–10.8)
WBC # BLD AUTO: 9.8 K/UL (ref 4.8–10.8)
WBC #/AREA URNS HPF: ABNORMAL /HPF

## 2019-05-04 PROCEDURE — 160046 HCHG PACU - 1ST 60 MINS PHASE II: Performed by: OBSTETRICS & GYNECOLOGY

## 2019-05-04 PROCEDURE — 99291 CRITICAL CARE FIRST HOUR: CPT

## 2019-05-04 PROCEDURE — 500448 HCHG DRESSING, TELFA 3X4: Performed by: OBSTETRICS & GYNECOLOGY

## 2019-05-04 PROCEDURE — 700105 HCHG RX REV CODE 258: Performed by: ANESTHESIOLOGY

## 2019-05-04 PROCEDURE — 700111 HCHG RX REV CODE 636 W/ 250 OVERRIDE (IP)

## 2019-05-04 PROCEDURE — 84702 CHORIONIC GONADOTROPIN TEST: CPT

## 2019-05-04 PROCEDURE — 160035 HCHG PACU - 1ST 60 MINS PHASE I: Performed by: OBSTETRICS & GYNECOLOGY

## 2019-05-04 PROCEDURE — 500864 HCHG NEEDLE, SPINAL 18G: Performed by: OBSTETRICS & GYNECOLOGY

## 2019-05-04 PROCEDURE — 76801 OB US < 14 WKS SINGLE FETUS: CPT

## 2019-05-04 PROCEDURE — 84703 CHORIONIC GONADOTROPIN ASSAY: CPT

## 2019-05-04 PROCEDURE — 160039 HCHG SURGERY MINUTES - EA ADDL 1 MIN LEVEL 3: Performed by: OBSTETRICS & GYNECOLOGY

## 2019-05-04 PROCEDURE — 160028 HCHG SURGERY MINUTES - 1ST 30 MINS LEVEL 3: Performed by: OBSTETRICS & GYNECOLOGY

## 2019-05-04 PROCEDURE — 700102 HCHG RX REV CODE 250 W/ 637 OVERRIDE(OP)

## 2019-05-04 PROCEDURE — 86901 BLOOD TYPING SEROLOGIC RH(D): CPT

## 2019-05-04 PROCEDURE — 160048 HCHG OR STATISTICAL LEVEL 1-5: Performed by: OBSTETRICS & GYNECOLOGY

## 2019-05-04 PROCEDURE — A9270 NON-COVERED ITEM OR SERVICE: HCPCS | Performed by: ANESTHESIOLOGY

## 2019-05-04 PROCEDURE — 85025 COMPLETE CBC W/AUTO DIFF WBC: CPT

## 2019-05-04 PROCEDURE — 36415 COLL VENOUS BLD VENIPUNCTURE: CPT

## 2019-05-04 PROCEDURE — 502587 HCHG PACK, D&C: Performed by: OBSTETRICS & GYNECOLOGY

## 2019-05-04 PROCEDURE — 80048 BASIC METABOLIC PNL TOTAL CA: CPT

## 2019-05-04 PROCEDURE — 160025 RECOVERY II MINUTES (STATS): Performed by: OBSTETRICS & GYNECOLOGY

## 2019-05-04 PROCEDURE — 86900 BLOOD TYPING SEROLOGIC ABO: CPT

## 2019-05-04 PROCEDURE — 700105 HCHG RX REV CODE 258: Performed by: EMERGENCY MEDICINE

## 2019-05-04 PROCEDURE — 160047 HCHG PACU  - EA ADDL 30 MINS PHASE II: Performed by: OBSTETRICS & GYNECOLOGY

## 2019-05-04 PROCEDURE — 81001 URINALYSIS AUTO W/SCOPE: CPT

## 2019-05-04 PROCEDURE — 160002 HCHG RECOVERY MINUTES (STAT): Performed by: OBSTETRICS & GYNECOLOGY

## 2019-05-04 PROCEDURE — 88305 TISSUE EXAM BY PATHOLOGIST: CPT

## 2019-05-04 PROCEDURE — 86850 RBC ANTIBODY SCREEN: CPT

## 2019-05-04 PROCEDURE — 700111 HCHG RX REV CODE 636 W/ 250 OVERRIDE (IP): Performed by: ANESTHESIOLOGY

## 2019-05-04 PROCEDURE — 700102 HCHG RX REV CODE 250 W/ 637 OVERRIDE(OP): Performed by: ANESTHESIOLOGY

## 2019-05-04 PROCEDURE — A9270 NON-COVERED ITEM OR SERVICE: HCPCS

## 2019-05-04 PROCEDURE — 700101 HCHG RX REV CODE 250: Performed by: ANESTHESIOLOGY

## 2019-05-04 PROCEDURE — 160009 HCHG ANES TIME/MIN: Performed by: OBSTETRICS & GYNECOLOGY

## 2019-05-04 RX ORDER — DIPHENHYDRAMINE HYDROCHLORIDE 50 MG/ML
12.5 INJECTION INTRAMUSCULAR; INTRAVENOUS
Status: DISCONTINUED | OUTPATIENT
Start: 2019-05-04 | End: 2019-05-04 | Stop reason: HOSPADM

## 2019-05-04 RX ORDER — OXYCODONE HYDROCHLORIDE AND ACETAMINOPHEN 5; 325 MG/1; MG/1
1-2 TABLET ORAL EVERY 4 HOURS PRN
Qty: 12 TAB | Refills: 0 | Status: SHIPPED | OUTPATIENT
Start: 2019-05-04 | End: 2019-05-11

## 2019-05-04 RX ORDER — HALOPERIDOL 5 MG/ML
1 INJECTION INTRAMUSCULAR
Status: DISCONTINUED | OUTPATIENT
Start: 2019-05-04 | End: 2019-05-04 | Stop reason: HOSPADM

## 2019-05-04 RX ORDER — SIMETHICONE 80 MG
80 TABLET,CHEWABLE ORAL EVERY 8 HOURS PRN
Status: DISCONTINUED | OUTPATIENT
Start: 2019-05-04 | End: 2019-05-04 | Stop reason: HOSPADM

## 2019-05-04 RX ORDER — MORPHINE SULFATE 4 MG/ML
4 INJECTION, SOLUTION INTRAMUSCULAR; INTRAVENOUS
Status: DISCONTINUED | OUTPATIENT
Start: 2019-05-04 | End: 2019-05-04 | Stop reason: HOSPADM

## 2019-05-04 RX ORDER — HYDROMORPHONE HYDROCHLORIDE 1 MG/ML
0.1 INJECTION, SOLUTION INTRAMUSCULAR; INTRAVENOUS; SUBCUTANEOUS
Status: DISCONTINUED | OUTPATIENT
Start: 2019-05-04 | End: 2019-05-04 | Stop reason: HOSPADM

## 2019-05-04 RX ORDER — ONDANSETRON 2 MG/ML
4 INJECTION INTRAMUSCULAR; INTRAVENOUS ONCE
Status: COMPLETED | OUTPATIENT
Start: 2019-05-04 | End: 2019-05-04

## 2019-05-04 RX ORDER — OXYCODONE HCL 5 MG/5 ML
SOLUTION, ORAL ORAL
Status: DISCONTINUED
Start: 2019-05-04 | End: 2019-05-04 | Stop reason: HOSPADM

## 2019-05-04 RX ORDER — SODIUM CHLORIDE, SODIUM LACTATE, POTASSIUM CHLORIDE, AND CALCIUM CHLORIDE .6; .31; .03; .02 G/100ML; G/100ML; G/100ML; G/100ML
500 INJECTION, SOLUTION INTRAVENOUS ONCE
Status: DISCONTINUED | OUTPATIENT
Start: 2019-05-04 | End: 2019-05-04 | Stop reason: HOSPADM

## 2019-05-04 RX ORDER — HYDROMORPHONE HYDROCHLORIDE 1 MG/ML
0.2 INJECTION, SOLUTION INTRAMUSCULAR; INTRAVENOUS; SUBCUTANEOUS
Status: DISCONTINUED | OUTPATIENT
Start: 2019-05-04 | End: 2019-05-04 | Stop reason: HOSPADM

## 2019-05-04 RX ORDER — SODIUM CHLORIDE 9 MG/ML
1000 INJECTION, SOLUTION INTRAVENOUS ONCE
Status: COMPLETED | OUTPATIENT
Start: 2019-05-04 | End: 2019-05-04

## 2019-05-04 RX ORDER — SODIUM CHLORIDE, SODIUM LACTATE, POTASSIUM CHLORIDE, CALCIUM CHLORIDE 600; 310; 30; 20 MG/100ML; MG/100ML; MG/100ML; MG/100ML
INJECTION, SOLUTION INTRAVENOUS
Status: DISCONTINUED | OUTPATIENT
Start: 2019-05-04 | End: 2019-05-04 | Stop reason: SURG

## 2019-05-04 RX ORDER — KETOROLAC TROMETHAMINE 30 MG/ML
INJECTION, SOLUTION INTRAMUSCULAR; INTRAVENOUS PRN
Status: DISCONTINUED | OUTPATIENT
Start: 2019-05-04 | End: 2019-05-04 | Stop reason: SURG

## 2019-05-04 RX ORDER — HYDROMORPHONE HYDROCHLORIDE 1 MG/ML
0.4 INJECTION, SOLUTION INTRAMUSCULAR; INTRAVENOUS; SUBCUTANEOUS
Status: DISCONTINUED | OUTPATIENT
Start: 2019-05-04 | End: 2019-05-04 | Stop reason: HOSPADM

## 2019-05-04 RX ORDER — MISOPROSTOL 100 UG/1
TABLET ORAL
Status: DISCONTINUED | OUTPATIENT
Start: 2019-05-04 | End: 2019-05-04 | Stop reason: HOSPADM

## 2019-05-04 RX ORDER — KETOROLAC TROMETHAMINE 30 MG/ML
15 INJECTION, SOLUTION INTRAMUSCULAR; INTRAVENOUS ONCE
Status: DISCONTINUED | OUTPATIENT
Start: 2019-05-04 | End: 2019-05-04 | Stop reason: HOSPADM

## 2019-05-04 RX ORDER — ACETAMINOPHEN 500 MG
500-1000 TABLET ORAL EVERY 6 HOURS PRN
Status: ON HOLD | COMMUNITY
End: 2019-05-04

## 2019-05-04 RX ORDER — ONDANSETRON 2 MG/ML
4 INJECTION INTRAMUSCULAR; INTRAVENOUS
Status: COMPLETED | OUTPATIENT
Start: 2019-05-04 | End: 2019-05-04

## 2019-05-04 RX ORDER — PROMETHAZINE HYDROCHLORIDE 25 MG/1
12.5 SUPPOSITORY RECTAL EVERY 4 HOURS PRN
Status: DISCONTINUED | OUTPATIENT
Start: 2019-05-04 | End: 2019-05-04 | Stop reason: HOSPADM

## 2019-05-04 RX ORDER — OXYCODONE HCL 5 MG/5 ML
5 SOLUTION, ORAL ORAL
Status: COMPLETED | OUTPATIENT
Start: 2019-05-04 | End: 2019-05-04

## 2019-05-04 RX ORDER — IBUPROFEN 600 MG/1
600 TABLET ORAL EVERY 6 HOURS PRN
Qty: 30 TAB | Refills: 1 | Status: ON HOLD | OUTPATIENT
Start: 2019-05-04 | End: 2020-03-06

## 2019-05-04 RX ORDER — OXYCODONE HCL 5 MG/5 ML
10 SOLUTION, ORAL ORAL
Status: COMPLETED | OUTPATIENT
Start: 2019-05-04 | End: 2019-05-04

## 2019-05-04 RX ORDER — MEPERIDINE HYDROCHLORIDE 25 MG/ML
12.5 INJECTION INTRAMUSCULAR; INTRAVENOUS; SUBCUTANEOUS
Status: DISCONTINUED | OUTPATIENT
Start: 2019-05-04 | End: 2019-05-04 | Stop reason: HOSPADM

## 2019-05-04 RX ADMIN — OXYCODONE HYDROCHLORIDE 10 MG: 5 SOLUTION ORAL at 10:49

## 2019-05-04 RX ADMIN — FENTANYL CITRATE 50 MCG: 50 INJECTION, SOLUTION INTRAMUSCULAR; INTRAVENOUS at 09:55

## 2019-05-04 RX ADMIN — ONDANSETRON 4 MG: 2 INJECTION INTRAMUSCULAR; INTRAVENOUS at 12:01

## 2019-05-04 RX ADMIN — FENTANYL CITRATE 50 MCG: 50 INJECTION, SOLUTION INTRAMUSCULAR; INTRAVENOUS at 10:00

## 2019-05-04 RX ADMIN — PROPOFOL 50 MG: 10 INJECTION, EMULSION INTRAVENOUS at 09:48

## 2019-05-04 RX ADMIN — SODIUM CHLORIDE 1000 ML: 9 INJECTION, SOLUTION INTRAVENOUS at 06:10

## 2019-05-04 RX ADMIN — SODIUM CHLORIDE, POTASSIUM CHLORIDE, SODIUM LACTATE AND CALCIUM CHLORIDE: 600; 310; 30; 20 INJECTION, SOLUTION INTRAVENOUS at 09:44

## 2019-05-04 RX ADMIN — PROPOFOL 50 MG: 10 INJECTION, EMULSION INTRAVENOUS at 09:51

## 2019-05-04 RX ADMIN — PROPOFOL 300 MCG/KG/MIN: 10 INJECTION, EMULSION INTRAVENOUS at 09:48

## 2019-05-04 RX ADMIN — KETOROLAC TROMETHAMINE 15 MG: 30 INJECTION, SOLUTION INTRAMUSCULAR at 10:11

## 2019-05-04 RX ADMIN — SODIUM CHLORIDE 1000 ML: 9 INJECTION, SOLUTION INTRAVENOUS at 05:30

## 2019-05-04 RX ADMIN — EPHEDRINE SULFATE 50 MG: 50 INJECTION INTRAMUSCULAR; INTRAVENOUS; SUBCUTANEOUS at 10:06

## 2019-05-04 ASSESSMENT — PAIN SCALES - WONG BAKER: WONGBAKER_NUMERICALRESPONSE: HURTS A WHOLE LOT

## 2019-05-04 ASSESSMENT — PAIN SCALES - GENERAL: PAIN_LEVEL: 0

## 2019-05-04 NOTE — ED NOTES
Surgeon / Clinician: Lele Hoyos MD    Date of Surgery:  07/18/2017     Preoperative Diagnosis:  Melanoma of the left shoulder.     Postoperative Diagnosis:  Melanoma of the left shoulder.      Name of Procedure:  Lymphatic banding, left axillary sentinel lymph node biopsy x1, and wide local excision of left arm melanoma.      Attending Surgeon:  Dr. Lele Hoyos.     Anesthesia:  General with LMA.      Indications for Surgery:  The patient is a 54-year-old man who presents with an intermediate thickness melanoma.  He underwent preoperative lymphoscintigraphy which revealed uptake to his ipsilateral axilla.  He now presents for surgical treatment.      Description of Procedure:  After informed consent, the patient was brought to the operating room, given a general anesthetic, and an LMA was inserted.  I injected isosulfan blue intradermally around his melanoma scar and his left deltoid region.  He was prepped and draped in the usual fashion.  We identified a transcutaneous hot spot in his left axilla, and local anesthetic was administered.  A transresector incision was made with a scalpel.  The Bovie cautery was used inside the subcutaneous tissues.  The dissection went to the clavipectoral fascia where I identified a blue-stained radioactive lymph node.  Some lymph node #1 was removed and had ex vivo counts of 119 counts per second.  After removal of the sentinel lymph node, there are no blue, radioactive or palpable lymph nodes.  This incision was closed with an interrupted 3-0 Vicryl suture for the dermal layer, then running 4-0 PDS subcuticular stitch for the skin.  Next, we margi out a 2-cm margin of resection around the melanoma biopsy site in his left deltoid region.  We oriented the incision along the long xis of the extremity.  A local anesthetic was administered.  An elliptical skin incision was made with a scalpel with a Bovie cautery used inside the subcutaneous tissues all the way down to the fascia.   US at bedside   The facia was left intact.  The subcutaneous tissues were dissected off of the fascia.  The specimen was removed, oriented and sent to pathology.  Strict hemostasis was maintained with the Bovie cautery.  We raised advancement flaps medially and laterally to facilitate closure.  The dermis was then closed with a #3-0 Vicryl suture for the dermal layer.  We placed 3-0 nylon retention sutures centrally in a horizontal mattress fashion.  We then closed the skin with a running 4-0 PDS subcuticular stitch.  Dermabond was placed, and the patient was taken to the recovery room in stable condition.      CC:  Dr. Rosa Jaquez.          Lele Hoyos MD    D:  07/18/2017 14:10 T:  07/21/2017 14:42  Document:  [id number]\[id number]

## 2019-05-04 NOTE — OR SURGEON
Immediate Post OP Note    PreOp Diagnosis: 1. Incomplete Ab  2. Rh+    PostOp Diagnosis: 1. Incomplete Ab 2. Rh+    Procedure(s):  DILATION AND CURETTAGE    Surgeon(s):  Josefina Ruth M.D.    Anesthesiologist/Type of Anesthesia:  Anesthesiologist: Padmini Delgado M.D./conscious sedation     Surgical Staff:  Circulator: Marjorie Devries  Scrub Person: Oni MILLER Donor    Specimens removed if any:  POC    Estimated Blood Loss: 50cc  Fluids: 1000cc   UOP: no catheter    Findings: Sound to 12cm, cervix widely dilated. Large POC obtained    Medications: Cytotec 600mcg MA, Lidocaine 1% plain 10cc paracervical    Complications: none apparent     [id number]      5/4/2019 8:55 AM Josefina Ruth M.D.

## 2019-05-04 NOTE — OP REPORT
DATE OF SERVICE:  2019    PREOPERATIVE DIAGNOSES:  1.  A 38-year-old  9, para 8.  2.  Incomplete miscarriage.  3.  Rh positive.    POSTOPERATIVE DIAGNOSES:  1.  A 38-year-old  9, para 8.  2.  Incomplete miscarriage.  3.  Rh positive.    PROCEDURE:  Suction dilation and curettage.    SURGEON:  Josefina Ruth MD    ANESTHESIOLOGIST:  Padmini Delgado MD    ANESTHESIA TYPE:  Conscious sedation.    DESCRIPTION OF PROCEDURE:  The patient is a 38-year-old  9, para 8, who   presented with a presumed incomplete AB.  She was having ongoing bleeding   requiring surgical management.  She was taken to the operating room where she   underwent conscious sedation.  She was positioned in a dorsal lithotomy   position and SCDs were applied.  She had voided just prior to the proceeding   to the operating room.  She was sterilely prepped and draped in the usual   fashion.  A speculum was placed within the vagina and the cervix was easily   visualized.  The cervix was widely dilated.  A single tooth tenaculum was   placed on the anterior lip of the cervix and she was given a paracervical   block with 10 mL of 1% lidocaine.  A curved suction curette size 9 was used.    Pressure was tested up to 50 mmHg.  This was inserted into the uterine cavity   and a large amount of remaining products of conception were removed.  Sharp   curettage was then performed following assuring adequate Cri in all 4   quadrants.  All surgical instruments were removed from the vagina.  Silver   nitrate was applied to the tenaculum sites.  The uterus was now small,   anteverted, and firm.  Cytotec 600 was placed per rectum.    FINDINGS:  Uterus sounded to 12 cm.  Cervix slightly dilated with large   products of conception obtained.    SPECIMENS:  Products of conception.    ESTIMATED BLOOD LOSS:  50 mL    FLUIDS:  1000 mL crystalloid.    URINE OUTPUT:  Not recorded.    MEDICATIONS:  Cytotec 600 mcg per rectum.  Lidocaine 1% plain, 10  mL   paracervical block.    COUNTS:  Correct.    COMPLICATIONS:  None apparent.    DISPOSITION:  Stable for routine postoperative care.  The patient will be   discharged home when she meets PACU criteria.       ____________________________________     MD YODIT SALAMANCA / BERNADETTE    DD:  05/04/2019 10:44:16  DT:  05/04/2019 11:31:25    D#:  [id number]  Job#:  [id number]

## 2019-05-04 NOTE — DISCHARGE INSTRUCTIONS
ACTIVITY: Rest and take it easy for the first 24 hours.  A responsible adult is recommended to remain with you during that time.  It is normal to feel sleepy.  We encourage you to not do anything that requires balance, judgment or coordination.    MILD FLU-LIKE SYMPTOMS ARE NORMAL. YOU MAY EXPERIENCE GENERALIZED MUSCLE ACHES, THROAT IRRITATION, HEADACHE AND/OR SOME NAUSEA.    FOR 24 HOURS DO NOT:  Drive, operate machinery or run household appliances.  Drink beer or alcoholic beverages.   Make important decisions or sign legal documents.    SPECIAL INSTRUCTIONS: CALL DR IF BLEEDING MORE THAN 2 PADS AN HOUR, OR FEVER GREATER THAN 101    DIET: To avoid nausea, slowly advance diet as tolerated, avoiding spicy or greasy foods for the first day.  Add more substantial food to your diet according to your physician's instructions.  Babies can be fed formula or breast milk as soon as they are hungry.  INCREASE FLUIDS AND FIBER TO AVOID CONSTIPATION.    SURGICAL DRESSING/BATHING: MAY SHOWER TOMORROW. NO TUB BATHS, SWIMMING, SEX OR HOT TUBS UNTIL CLEARED BY PHYSICIAN     FOLLOW-UP APPOINTMENT:  A follow-up appointment should be arranged with your doctor KEEP YOUR APPT FOR NEXT WEEK call to schedule.    You should CALL YOUR PHYSICIAN if you develop:  Fever greater than 101 degrees F.  Pain not relieved by medication, or persistent nausea or vomiting.  Excessive bleeding (blood soaking through dressing) or unexpected drainage from the wound.  Extreme redness or swelling around the incision site, drainage of pus or foul smelling drainage.  Inability to urinate or empty your bladder within 8 hours.  Problems with breathing or chest pain.    You should call 911 if you develop problems with breathing or chest pain.  If you are unable to contact your doctor or surgical center, you should go to the nearest emergency room or urgent care center.  Physician's telephone #:[phone]    If any questions arise, call your doctor.  If your  doctor is not available, please feel free to call the Surgical Center at [phone].  The Center is open Monday through Friday from 7AM to 7PM.  You can also call the HEALTH HOTLINE open 24 hours/day, 7 days/week and speak to a nurse at [phone], or toll free at [phone].    A registered nurse may call you a few days after your surgery to see how you are doing after your procedure.    MEDICATIONS: Resume taking daily medication.  Take prescribed pain medication with food.  If no medication is prescribed, you may take non-aspirin pain medication if needed.  PAIN MEDICATION CAN BE VERY CONSTIPATING.  Take a stool softener or laxative such as senokot, pericolace, or milk of magnesia if needed.      Last pain medication given at 10:45.    If your physician has prescribed pain medication that includes Acetaminophen (Tylenol), do not take additional Acetaminophen (Tylenol) while taking the prescribed medication.    Depression / Suicide Risk    As you are discharged from this [facility], it is important to learn how to keep safe from harming yourself.    Recognize the warning signs:  · Abrupt changes in personality, positive or negative- including increase in energy   · Giving away possessions  · Change in eating patterns- significant weight changes-  positive or negative  · Change in sleeping patterns- unable to sleep or sleeping all the time   · Unwillingness or inability to communicate  · Depression  · Unusual sadness, discouragement and loneliness  · Talk of wanting to die  · Neglect of personal appearance   · Rebelliousness- reckless behavior  · Withdrawal from people/activities they love  · Confusion- inability to concentrate     If you or a loved one observes any of these behaviors or has concerns about self-harm, here's what you can do:  · Talk about it- your feelings and reasons for harming yourself  · Remove any means that you might use to hurt yourself (examples: pills, rope, extension  cords, firearm)  · Get professional help from the community (Mental Health, Substance Abuse, psychological counseling)  · Do not be alone:Call your Safe Contact- someone whom you trust who will be there for you.  · Call your local CRISIS HOTLINE 386-9916 or 298-823-4340  · Call your local Children's Mobile Crisis Response Team [place] (461) 241-6231 or www.[employer]  · Call the toll free National Suicide Prevention Hotlines   · National Suicide Prevention Lifeline 814-208-WPBJ (0129)  · National Hope Line Network 800-SUICIDE (772-7550)

## 2019-05-04 NOTE — ED NOTES
ERP at bedside for repeat pelvic exam. Pt continues to pass large clots. Heavy bleeding noted. VSS at this time. Awaiting OBGYN to arrive. Pt updated

## 2019-05-04 NOTE — H&P
DATE OF SERVICE:  2019    HISTORY OF PRESENT ILLNESS:  This is a 38-year-old  9, para 8 who   presents to the emergency room with vaginal bleeding.  Her last menstrual   period was , which would make her 14 weeks and 1 day.  She has not yet   established for prenatal care and had an appointment next week.  She started   having light bleeding last week, but had heavy bleeding starting yesterday   evening and presented to the emergency room for this as well as heavy   cramping.  She passed some tissue, which was likely consistent with blood clot   per the emergency room physician.  She has had ongoing blood loss in the ER   with an estimated blood loss of at least 600 mL of observed blood.    PAST MEDICAL HISTORY:  None.    PAST SURGICAL HISTORY:  None.    MEDICATIONS:  Prenatal vitamins.    ALLERGIES:  NKDA.    GYNECOLOGIC HISTORY:  No history of sexually transmitted diseases or HSV.    OBSTETRICAL HISTORY:  Patient is a  9, para 8.  She has had 8   uncomplicated vaginal deliveries.    SOCIAL HISTORY:  Patient is .  She presents at this time with her   .  She denies tobacco or drugs.    OBJECTIVE:  VITAL SIGNS:  Temperature 36.9, pulse 100, blood pressure 111/65.  GENERAL:  She is well-developed, well-nourished female in no acute distress.  HEENT:  Atraumatic, normocephalic.  CARDIOVASCULAR:  Regular rhythm, mild tachycardia noted.  LUNGS:  Clear to auscultation bilaterally.  ABDOMEN:  Soft, nontender.  No rebound or guarding.  PELVIC:  Shows moderate bleeding from the cervical os, no tissue within the   cervical os.  Uterus is slightly enlarged.  Adnexa are not palpable.  EXTREMITIES:  No clubbing, cyanosis or edema.    LABORATORY DATA:  Hemoglobin 12.8, now 10.4; hematocrit 38.7, now 32.9;   platelets 208.  HCG is 7762.  Blood type O positive.  Ultrasound, irregular   fluid-filled structure within the endometrial cavity, likely representing   hemorrhage.  No normal gestational  sac or fetal parts are identified.  Ovaries   are not visualized.    ASSESSMENT AND PLAN:  This is a 38-year-old  9, para 8 with a presumed   incomplete miscarriage.  1.  Patient is hemodynamically stable; however, she has had 600 mL of blood   loss and has mild tachycardia with ongoing bleeding.  Her hCG is above the   discriminatory zone and clinically she likely has a miscarriage.  I would   recommend a dilation and curettage to improve the bleeding as well as   hopefully document products of conception.  Given that an intrauterine   pregnancy was not officially diagnosed, she will need follow up on her   pathology and wants to rule out an ectopic pregnancy as well.  She has been   counseled and consented for suction dilation and curettage.  All of her   questions answered.  She also consents to transfusion if clinically indicated.    She is Rh positive and will not require RhoGAM.  2.  We plan to proceed to the operating room shortly.       ____________________________________     MD YODIT SALAMANCA / BERNADETTE    DD:  2019 08:55:01  DT:  2019 09:11:30    D#:  [id number]  Job#:  [id number]

## 2019-05-04 NOTE — ANESTHESIA POSTPROCEDURE EVALUATION
Patient: Karolyn Hurley    Procedure Summary     Date:  05/04/19 Room / Location:  [facility] OR 09 / SURGERY [facility]    Anesthesia Start:  0944 Anesthesia Stop:  1019    Procedure:  DILATION AND CURETTAGE (N/A Vagina ) Diagnosis:  (MISSED AB)    Surgeon:  Josefina Ruth M.D. Responsible Provider:  Padmini Delgado M.D.    Anesthesia Type:  general ASA Status:  2 - Emergent          Final Anesthesia Type: general  Last vitals  BP   Blood Pressure: 107/58, NIBP: 120/66    Temp   36.3 °C (97.3 °F)    Pulse   Pulse: 81, Heart Rate (Monitored): 77   Resp   20    SpO2   98 %      Anesthesia Post Evaluation    Patient location during evaluation: PACU  Patient participation: complete - patient participated  Level of consciousness: awake and alert  Pain score: 0    Airway patency: patent  Anesthetic complications: no  Cardiovascular status: hemodynamically stable and adequate  Respiratory status: acceptable, nonlabored ventilation and nasal cannula  Hydration status: euvolemic    PONV: none           Nurse Pain Score: 0 (NPRS)

## 2019-05-04 NOTE — ANESTHESIA PREPROCEDURE EVALUATION
39yo F w/ incomplete , actively bleeding.    Allergies   Allergen Reactions   • Nkda [No Known Drug Allergy]       Relevant Problems   (+) Fatty liver   (+) Gestational diabetes      Within Normal Limits   CARDIAC   NEURO   PULMONARY      Pertinent Negatives   (-) History of anesthesia complications   (-) Sleep apnea     Past Medical History:   Diagnosis Date   • Gestational diabetes 2019        History reviewed. No pertinent surgical history.     Vitals:    19 0800 19 0815 19 0830 19 0909   BP:    108/68   Pulse: 91 93 96 92   Resp:    20   Temp:    37.3 °C (99.1 °F)   TempSrc:    Temporal   SpO2: 98% 98% 98% 97%   Weight:       Height:         Physical Exam    Airway   Mallampati: I  TM distance: <3 FB  Neck ROM: full       Cardiovascular   Rhythm: regular  Rate: normal  (-) murmur     Dental - normal exam         Pulmonary   Breath sounds clear to auscultation  (-) wheezes     Abdominal    Neurological - normal exam                 Anesthesia Plan    ASA 2 - emergent   ASA physical status emergent criteria: acute hemorrhage    Plan - MAC       (Patient last ate 8pm, only sips of water since, last > 2h ago, appropriately NPO.   Discussed deep sedation with GA backup.)      Induction: intravenous      Pertinent diagnostic labs and testing reviewed    Informed Consent:    Anesthetic plan and risks discussed with patient.

## 2019-05-04 NOTE — OR NURSING
Assumed care of patient from PACU, see flow sheets for vital signs. Patient alert and oriented times four. Assessment completed. Pain is controlled and tolerable for patient. Patient updated of plan of care for discharge. Family/friend at bedside with patient. Discharge instructions reviewed and all questions answered. All needs met, patient dressed and safe to discharge home.   Patient tolerating fluids and food with no nausea.    1200: Patient up to bathroom, voided but became nauseous. Medicated with zofran.     1300: Patient feeling much better, no nausea, no pain.    1306: Patient ready to go, last vital signs in flow sheet. PIV removed. Patient taken to car in wheelchair. Patient safe to discharge.

## 2019-05-04 NOTE — ED NOTES
Assumed care of pt. Pt with heavy bleeding and clotting. BP's trending down. ERP called to bedside. Lab called regarding beta hcg.  Lab to expedite results. ERP notified.

## 2019-05-04 NOTE — ED PROVIDER NOTES
"ED Provider Note    CHIEF COMPLAINT  Chief Complaint   Patient presents with   • Vaginal Bleeding       HPI  Karolyn Hurley is a 38 y.o. female who presents to the emergency department with vaginal bleeding.  Reports last normal menstrual period January 25.  Positive pregnancy test at home.  G9, P8 with no problems during any previous pregnancy.  She has mild spotting over the last couple days.  Developed heavy bleeding last night and early this morning.  This is continued.  She has associated lower abdominal cramping pain that is worse when she moves.  She has not had a fever or chills.  No dysuria or hematuria.  No flank pain.  She feels dizzy and weak.    REVIEW OF SYSTEMS  As per HPI, otherwise a 10 point review of systems is negative    PAST MEDICAL HISTORY  Denies medical problems    SOCIAL HISTORY  Social History   Substance Use Topics   • Smoking status: Never Smoker   • Smokeless tobacco: Never Used   • Alcohol use No       SURGICAL HISTORY  History reviewed. No pertinent surgical history.    CURRENT MEDICATIONS  Home Medications    **Home medications have not yet been reviewed for this encounter**         ALLERGIES  Allergies   Allergen Reactions   • Nkda [No Known Drug Allergy]        PHYSICAL EXAM  VITAL SIGNS: /77   Pulse (!) 101   Temp 36.9 °C (98.5 °F) (Temporal)   Resp 20   Ht 1.626 m (5' 4\")   Wt 68.4 kg (150 lb 12.7 oz)   LMP 01/25/2019   SpO2 98%   BMI 25.88 kg/m²    Constitutional: Awake and alert.  Anxious appearing  HENT:  Atraumatic, Normocephalic.Oropharynx dry mucus membranes, Nose normal inspection.   Eyes: Normal inspection  Neck: Supple  Cardiovascular: Tachycardic heart rate, Normal rhythm.  Symmetric peripheral pulses.   Thorax & Lungs: No respiratory distress, No wheezing, No rales, No rhonchi, No chest tenderness.   Abdomen: Tenderness across the lower abdomen.  Uterus is not palpable.  No rebound or peritonitis.  Pelvic examination: Significant blood within " the vault.  This is suctioned.  About 150 cc of bright red blood and clots was removed.  The cervical loss is closed with moderate ongoing bleeding.  There is no palpable mass.  Skin: Warm, Dry, No rash.   Extremities: Well-perfused  Neurologic: Grossly normal   Psychiatric: Anxious appearing    RADIOLOGY/PROCEDURES  US-OB 1ST TRIMESTER WITH TRANSVAGINAL (COMBO)   Final Result      No evidence of normal or viable intrauterine gestation. Probable hemorrhagic debris within the endometrial cavity.           Imaging is interpreted by radiologist    Labs:  Results for orders placed or performed during the hospital encounter of 05/04/19   CBC WITH DIFFERENTIAL   Result Value Ref Range    WBC 9.8 4.8 - 10.8 K/uL    RBC 4.55 4.20 - 5.40 M/uL    Hemoglobin 12.8 12.0 - 16.0 g/dL    Hematocrit 38.7 37.0 - 47.0 %    MCV 85.1 81.4 - 97.8 fL    MCH 28.1 27.0 - 33.0 pg    MCHC 33.1 (L) 33.6 - 35.0 g/dL    RDW 44.7 35.9 - 50.0 fL    Platelet Count 216 164 - 446 K/uL    MPV 10.6 9.0 - 12.9 fL    Neutrophils-Polys 64.40 44.00 - 72.00 %    Lymphocytes 29.00 22.00 - 41.00 %    Monocytes 5.10 0.00 - 13.40 %    Eosinophils 0.90 0.00 - 6.90 %    Basophils 0.30 0.00 - 1.80 %    Immature Granulocytes 0.30 0.00 - 0.90 %    Nucleated RBC 0.00 /100 WBC    Neutrophils (Absolute) 6.31 2.00 - 7.15 K/uL    Lymphs (Absolute) 2.84 1.00 - 4.80 K/uL    Monos (Absolute) 0.50 0.00 - 0.85 K/uL    Eos (Absolute) 0.09 0.00 - 0.51 K/uL    Baso (Absolute) 0.03 0.00 - 0.12 K/uL    Immature Granulocytes (abs) 0.03 0.00 - 0.11 K/uL    NRBC (Absolute) 0.00 K/uL   BASIC METABOLIC PANEL   Result Value Ref Range    Sodium 137 135 - 145 mmol/L    Potassium 3.4 (L) 3.6 - 5.5 mmol/L    Chloride 106 96 - 112 mmol/L    Co2 22 20 - 33 mmol/L    Glucose 108 (H) 65 - 99 mg/dL    Bun 10 8 - 22 mg/dL    Creatinine 0.57 0.50 - 1.40 mg/dL    Calcium 8.7 8.5 - 10.5 mg/dL    Anion Gap 9.0 0.0 - 11.9   URINALYSIS (UA)   Result Value Ref Range    Color Yellow     Character Clear      Specific Gravity 1.010 <1.035    Ph 6.0 5.0 - 8.0    Glucose Negative Negative mg/dL    Ketones Negative Negative mg/dL    Protein Negative Negative mg/dL    Bilirubin Negative Negative    Urobilinogen, Urine 0.2 Negative    Nitrite Negative Negative    Leukocyte Esterase Negative Negative    Occult Blood Small (A) Negative    Micro Urine Req Microscopic    COD - Adult (Type and Screen)   Result Value Ref Range    ABO Grouping Only O     Rh Grouping Only POS     Antibody Screen-Cod NEG    ESTIMATED GFR   Result Value Ref Range    GFR If African American >60 >60 mL/min/1.73 m 2    GFR If Non African American >60 >60 mL/min/1.73 m 2   URINE MICROSCOPIC (W/UA)   Result Value Ref Range    WBC 0-2 /hpf    RBC 5-10 (A) /hpf    Bacteria Negative None /hpf    Epithelial Cells Few /hpf    Hyaline Cast 6-10 (A) /lpf   BETA-HCG QUALITATIVE SERUM   Result Value Ref Range    Beta-Hcg Qualitative Serum Positive (A) Negative   CBC WITH DIFFERENTIAL   Result Value Ref Range    WBC 15.5 (H) 4.8 - 10.8 K/uL    RBC 3.83 (L) 4.20 - 5.40 M/uL    Hemoglobin 10.4 (L) 12.0 - 16.0 g/dL    Hematocrit 32.9 (L) 37.0 - 47.0 %    MCV 85.9 81.4 - 97.8 fL    MCH 27.2 27.0 - 33.0 pg    MCHC 31.6 (L) 33.6 - 35.0 g/dL    RDW 45.5 35.9 - 50.0 fL    Platelet Count 208 164 - 446 K/uL    MPV 10.7 9.0 - 12.9 fL    Neutrophils-Polys 84.90 (H) 44.00 - 72.00 %    Lymphocytes 11.10 (L) 22.00 - 41.00 %    Monocytes 3.00 0.00 - 13.40 %    Eosinophils 0.10 0.00 - 6.90 %    Basophils 0.40 0.00 - 1.80 %    Immature Granulocytes 0.50 0.00 - 0.90 %    Nucleated RBC 0.00 /100 WBC    Neutrophils (Absolute) 13.12 (H) 2.00 - 7.15 K/uL    Lymphs (Absolute) 1.72 1.00 - 4.80 K/uL    Monos (Absolute) 0.46 0.00 - 0.85 K/uL    Eos (Absolute) 0.02 0.00 - 0.51 K/uL    Baso (Absolute) 0.06 0.00 - 0.12 K/uL    Immature Granulocytes (abs) 0.07 0.00 - 0.11 K/uL    NRBC (Absolute) 0.00 K/uL   Chart review Rh+    Medications   morphine (pf) 4 mg/ml injection 4 mg (4 mg Intravenous  Return to ADS 5/4/19 0506)   ondansetron (ZOFRAN) syringe/vial injection 4 mg (4 mg Intravenous Return to ADS 5/4/19 6711)   NS infusion 1,000 mL (0 mL Intravenous Stopped 5/4/19 0621)   NS infusion 1,000 mL (0 mL Intravenous Stopped 5/4/19 0611)       HYDRATION: Based on the patient's presentation of Abnormal Fluid Loss the patient was given IV fluids. IV Hydration was used because oral hydration was not adequate alone.    COURSE & MEDICAL DECISION MAKING  Patient presents with vaginal bleeding with reported pregnancy.  Examination showed a closed cervical loss with ongoing bleeding.  Her blood pressure was marginal.  Her heart rate was slightly elevated.  She was given a liter of normal saline.  Blood pressure remained low and was given a second liter of saline.  She had significant pain was given morphine and Zofran.  Ultrasound does not demonstrate intrauterine pregnancy.  There was reported hemorrhagic debris within the uterus.    Repeat pelvic examination was performed.  She had continued ongoing bleeding and another 250 cc of blood was aspirated.  Pregnancy test was delayed.  I ordered a repeat CBC.    Ultimately pregnancy test returned positive.  I paged Dr. charles at that time.  I do not have a quantitative hCG.  I repeated pelvic examination.  I had been hoping that the patient's bleeding would subside as her miscarriage completed, but unfortunately patient had more clots and continued moderate bleeding.  Her cervical loss was no open.    I spoke with Dr. charles.  No medication intervention stated that she would come to the emergency department and evaluate the patient with tentative plan for operative intervention.      FINAL IMPRESSION  1.  Incomplete miscarriage  2.  Vaginal bleeding      CRITICAL CARE TIME 30 minutes  There was a very real possibility of deterioration of the patient's condition.  This patient required the highest level of care.  I provided critical care services which included:  review of the medical record, treatment orders, ordering and reviewing test results, frequent reevaluation of the patient's condition and response to treatment, as well as discussing the case with appropriate personnel and various consultants. The critical care time associated with the care of this patient is exclusive of any procedures or specific interventions.    This dictation was created using voice recognition software. The accuracy of the dictation is limited to the abilities of the software.  The nursing notes were reviewed and certain aspects of this information were incorporated into this note.      Electronically signed by: Kenrick Zaragoza, 5/4/2019 7:40 AM

## 2019-05-06 LAB — PATHOLOGY CONSULT NOTE: NORMAL

## 2020-03-05 ENCOUNTER — HOSPITAL ENCOUNTER (OUTPATIENT)
Facility: MEDICAL CENTER | Age: 40
End: 2020-03-06
Attending: OBSTETRICS & GYNECOLOGY | Admitting: OBSTETRICS & GYNECOLOGY
Payer: COMMERCIAL

## 2020-03-05 ENCOUNTER — APPOINTMENT (OUTPATIENT)
Dept: OBGYN | Facility: MEDICAL CENTER | Age: 40
End: 2020-03-05
Attending: OBSTETRICS & GYNECOLOGY
Payer: COMMERCIAL

## 2020-03-05 LAB
BASOPHILS # BLD AUTO: 0.1 % (ref 0–1.8)
BASOPHILS # BLD: 0.01 K/UL (ref 0–0.12)
EOSINOPHIL # BLD AUTO: 0.03 K/UL (ref 0–0.51)
EOSINOPHIL NFR BLD: 0.4 % (ref 0–6.9)
ERYTHROCYTE [DISTWIDTH] IN BLOOD BY AUTOMATED COUNT: 49.7 FL (ref 35.9–50)
FERRITIN SERPL-MCNC: 4 NG/ML (ref 10–291)
HCT VFR BLD AUTO: 30.5 % (ref 37–47)
HGB BLD-MCNC: 9.7 G/DL (ref 12–16)
HGB RETIC QN AUTO: 29.9 PG/CELL (ref 29–35)
IMM GRANULOCYTES # BLD AUTO: 0.04 K/UL (ref 0–0.11)
IMM GRANULOCYTES NFR BLD AUTO: 0.5 % (ref 0–0.9)
IMM RETICS NFR: 20.4 % (ref 9.3–17.4)
IRON SATN MFR SERPL: 23 % (ref 15–55)
IRON SERPL-MCNC: 137 UG/DL (ref 40–170)
LYMPHOCYTES # BLD AUTO: 1.68 K/UL (ref 1–4.8)
LYMPHOCYTES NFR BLD: 21.1 % (ref 22–41)
MCH RBC QN AUTO: 26.9 PG (ref 27–33)
MCHC RBC AUTO-ENTMCNC: 31.8 G/DL (ref 33.6–35)
MCV RBC AUTO: 84.7 FL (ref 81.4–97.8)
MONOCYTES # BLD AUTO: 0.62 K/UL (ref 0–0.85)
MONOCYTES NFR BLD AUTO: 7.8 % (ref 0–13.4)
NEUTROPHILS # BLD AUTO: 5.58 K/UL (ref 2–7.15)
NEUTROPHILS NFR BLD: 70.1 % (ref 44–72)
NRBC # BLD AUTO: 0 K/UL
NRBC BLD-RTO: 0 /100 WBC
PLATELET # BLD AUTO: 171 K/UL (ref 164–446)
PMV BLD AUTO: 11.3 FL (ref 9–12.9)
RBC # BLD AUTO: 3.6 M/UL (ref 4.2–5.4)
RETICS # AUTO: 0.08 M/UL (ref 0.04–0.06)
RETICS/RBC NFR: 2.3 % (ref 0.8–2.1)
TIBC SERPL-MCNC: 601 UG/DL (ref 250–450)
TRANSFERRIN SERPL-MCNC: 429 MG/DL (ref 200–370)
WBC # BLD AUTO: 8 K/UL (ref 4.8–10.8)

## 2020-03-05 PROCEDURE — 85046 RETICYTE/HGB CONCENTRATE: CPT

## 2020-03-05 PROCEDURE — A9270 NON-COVERED ITEM OR SERVICE: HCPCS | Performed by: OBSTETRICS & GYNECOLOGY

## 2020-03-05 PROCEDURE — 700111 HCHG RX REV CODE 636 W/ 250 OVERRIDE (IP): Performed by: OBSTETRICS & GYNECOLOGY

## 2020-03-05 PROCEDURE — 84466 ASSAY OF TRANSFERRIN: CPT

## 2020-03-05 PROCEDURE — 96365 THER/PROPH/DIAG IV INF INIT: CPT

## 2020-03-05 PROCEDURE — 83540 ASSAY OF IRON: CPT

## 2020-03-05 PROCEDURE — 700105 HCHG RX REV CODE 258: Performed by: OBSTETRICS & GYNECOLOGY

## 2020-03-05 PROCEDURE — 36415 COLL VENOUS BLD VENIPUNCTURE: CPT

## 2020-03-05 PROCEDURE — 85025 COMPLETE CBC W/AUTO DIFF WBC: CPT

## 2020-03-05 PROCEDURE — 83550 IRON BINDING TEST: CPT

## 2020-03-05 PROCEDURE — 59025 FETAL NON-STRESS TEST: CPT

## 2020-03-05 PROCEDURE — 700105 HCHG RX REV CODE 258

## 2020-03-05 PROCEDURE — 82728 ASSAY OF FERRITIN: CPT

## 2020-03-05 PROCEDURE — 700102 HCHG RX REV CODE 250 W/ 637 OVERRIDE(OP): Performed by: OBSTETRICS & GYNECOLOGY

## 2020-03-05 RX ORDER — ACETAMINOPHEN 325 MG/1
650 TABLET ORAL ONCE
Status: COMPLETED | OUTPATIENT
Start: 2020-03-05 | End: 2020-03-05

## 2020-03-05 RX ORDER — DIPHENHYDRAMINE HYDROCHLORIDE 50 MG/ML
25 INJECTION INTRAMUSCULAR; INTRAVENOUS ONCE
Status: COMPLETED | OUTPATIENT
Start: 2020-03-05 | End: 2020-03-05

## 2020-03-05 RX ORDER — SODIUM CHLORIDE 9 MG/ML
INJECTION, SOLUTION INTRAVENOUS
Status: COMPLETED
Start: 2020-03-05 | End: 2020-03-05

## 2020-03-05 RX ORDER — SODIUM CHLORIDE 9 MG/ML
INJECTION, SOLUTION INTRAVENOUS CONTINUOUS
Status: DISCONTINUED | OUTPATIENT
Start: 2020-03-05 | End: 2020-03-06 | Stop reason: HOSPADM

## 2020-03-05 RX ORDER — DIPHENHYDRAMINE HCL 25 MG
25 TABLET ORAL ONCE
Status: COMPLETED | OUTPATIENT
Start: 2020-03-05 | End: 2020-03-05

## 2020-03-05 RX ADMIN — SODIUM CHLORIDE: 9 INJECTION, SOLUTION INTRAVENOUS at 22:49

## 2020-03-05 RX ADMIN — SODIUM CHLORIDE 25 MG: 9 INJECTION, SOLUTION INTRAVENOUS at 22:14

## 2020-03-05 RX ADMIN — DIPHENHYDRAMINE HYDROCHLORIDE 25 MG: 25 TABLET ORAL at 21:49

## 2020-03-05 RX ADMIN — ACETAMINOPHEN 650 MG: 325 TABLET, FILM COATED ORAL at 21:49

## 2020-03-05 ASSESSMENT — FIBROSIS 4 INDEX: FIB4 SCORE: 0.94

## 2020-03-05 ASSESSMENT — LIFESTYLE VARIABLES
EVER_SMOKED: NEVER
ALCOHOL_USE: NO

## 2020-03-05 ASSESSMENT — PATIENT HEALTH QUESTIONNAIRE - PHQ9
SUM OF ALL RESPONSES TO PHQ9 QUESTIONS 1 AND 2: 0
1. LITTLE INTEREST OR PLEASURE IN DOING THINGS: NOT AT ALL
2. FEELING DOWN, DEPRESSED, IRRITABLE, OR HOPELESS: NOT AT ALL

## 2020-03-05 ASSESSMENT — PAIN SCALES - GENERAL: PAINLEVEL: 0 - NO PAIN

## 2020-03-06 VITALS
HEART RATE: 90 BPM | BODY MASS INDEX: 29.19 KG/M2 | WEIGHT: 171 LBS | OXYGEN SATURATION: 98 % | RESPIRATION RATE: 16 BRPM | HEIGHT: 64 IN | DIASTOLIC BLOOD PRESSURE: 60 MMHG | TEMPERATURE: 97.8 F | SYSTOLIC BLOOD PRESSURE: 104 MMHG

## 2020-03-06 PROCEDURE — 59025 FETAL NON-STRESS TEST: CPT

## 2020-03-06 PROCEDURE — 96366 THER/PROPH/DIAG IV INF ADDON: CPT

## 2020-03-06 PROCEDURE — 700105 HCHG RX REV CODE 258: Performed by: OBSTETRICS & GYNECOLOGY

## 2020-03-06 PROCEDURE — 700111 HCHG RX REV CODE 636 W/ 250 OVERRIDE (IP): Performed by: OBSTETRICS & GYNECOLOGY

## 2020-03-06 RX ADMIN — SODIUM CHLORIDE 1300 MG: 9 INJECTION, SOLUTION INTRAVENOUS at 00:37

## 2020-03-06 NOTE — PROGRESS NOTES
"1900- Received report from LIVAN Corrales RN. POC discussed, pt eating in bed. FOB at side. RN updated pt on process of the transfusion and waiting on lab results. Pt verbalized understanding.   2039- RN tried calling pharmacy regarding lab values for Iron transfusion. Phone busy, will try again.   2040- RN on phone with Pharmacist Maday regarding iron lab levels/values.   2045- RN transferred to Pharmacist Jovan for consult.   2050- RN called Dr. Hannah regarding conversation with pharmacy. Dr. Hannah wants pt to receive transfusion.   2100- RN called [facility] Pharmacist back. Order put in by pharmacist for RN.   2149- Pre medications given, see MAR. RN at bedside. Pt states \"I'm shakey, I am nervous.\" RN comforts pt. Pt restates \"I am just nervous, ya know.\"  2220-Pt states she feels tired, other than that no other side effects. VSS.  2044- Test dose complete. Pt states she is tired. RN asks pt if she has ever taken Benadryl before. Pt states \"I don't think so.\" RN explains it is the benadryl making her tired. RN tells pt to let her know if it becomes hard to breathe. RN at bedside during entire test dose infusion. Pt states she can feel her contractions. RN called pharmacy to ask about NS infusion, pharmacist states it is ok to give after test dose of iron dextran. VSS  2255- RN at bedside. Pt states she feels ok \"just tired.\" VSS  2305- RN at bedside pt states \"I am tired.\" VSS.   2338- RN at bedside. Pts significant other states \"she usually doesn't take medication that makes her drowsy. I think the medication is making her nauseous.\" RN talks to pt and significant other regarding adverse reactions and pt states \"I am just tired.\" Pt and significant other want to continue with the plan of the iron infusion.   2355- RN called for therapeutic dose of medication.   0020- RN has not yet received therapeutic dose of medication. RN called pharmacy  0022- RN at bedside, RN asks pt if she feels ok and if she can feel her " "contractions, pt states \"its fine.\"  0028- RN called Dr. Hannah, no answer. RN called Dr. Kraus to have him observe FHT. He states baby is category one and reactive. Continue to monitor. Therapeutic dose of iron coming from pharmacy.   0050- RN at bedside. Pt sleeping. RN woke pt up. Pt states she does not feel dizziness, nauseous, or lightheaded, just tired and wants to sleep.   0100- RN at bedside. Pt sleeping. RN wakes up pt. Pt states \"I feel ok just tired\"  0116- RN at bedside. Pt sleeping. VSS.   0120- Dr. Thompson called RN back regarding pt and FHT.  reviewed FHT.  Report given. Dr. Hannah stated the pt can go home an hour after the infusion is complete. Obtain reactive NST on baby.   0135- Pt sleeping. VSS  0155- RN at bedside, pt sleeping. RN wakes pt up. Pt states she feels \"fine\"  0209- Pt says \"I am tired but I can't fall asleep.\" RN at bedside. VSS. Pt able to jenna through contraction from 9618-8429.   0220- Pt sleeping. VSS.   0250- VSS.   0301- See interventions in flowsheets.   0303- Dr. Kraus called rgarding FHT.   0310- Dr. Kraus at bedside. He states \"strip has been reactive.\" reviews strip. Order to restart iron and to get pt juice.   0500- Pt states \"I didn't sleep good ya know, so now i'm going to feel bad all day.\" This bed makes my back hurt, I couldn't sleep.\" VSS.  0730- RN had Dr. Kraus review FHT. He states \"I am not worried. Get pt a breakfast tray.\" Report given to LUIS A Thomson RN. POC discussed. All questions answered.  "

## 2020-03-06 NOTE — H&P
CHIEF COMPLAINT:  Patient presented for blood transfusion.    HISTORY OF PRESENT ILLNESS:  Patient is a 39-year-old female  10, para   8, whose estimated due date is 2020.  She presented for an iron   transfusion for iron deficiency anemia.  Transfusion was given without   complication.    There was some question in the fetal heart rate tracing, was a little small   deceleration of 2-3 minutes.  There was times of decreased variability, but   overall was reactive.  I did a biophysical profile for reassurance.    Biophysical profile was 8/10.    PAST MEDICAL HISTORY:  Significant for gestational diabetes.    PAST SURGICAL HISTORY:  None.    ALLERGIES:  None.    HABITS:  The patient denies tobacco, alcohol, or drug use.    OBSTETRICAL HISTORY:  OB care has otherwise been uncomplicated except for iron   deficiency anemia.    PHYSICAL EXAMINATION:  VITAL SIGNS:  Vitals are normal.  Blood pressure is 104/60.  HEENT:  Head is atraumatic and normocephalic.  ABDOMEN:  Gravid, soft, and nontender.  PELVIC:  Deferred.  Nonstress test showed a reactive strip through the night.    She did have one deceleration and some times of absent variability consistent   with the sleep cycle.    Biophysical profile was 8/10.  Baby was very active, but there was no fetal   breathing,  S/D ratio was right around 2.25.  Amniotic fluid index was 11 cm.    ASSESSMENT:  1.  Intrauterine pregnancy at 35 and 4/7th weeks.  2.  Iron deficiency anemia.  3.  Advanced maternal age.    PLAN:  Patient will be discharged home.  Fetal movement precautions were   reviewed.  She has an appointment with her obstetrician in 5 days.       ____________________________________     MD CA OSUNA / NTS    DD:  2020 08:47:59  DT:  2020 09:13:39    D#:  [id number]  Job#:  [id number]

## 2020-03-06 NOTE — CARE PLAN
Problem: Risk for Infection, Impaired Wound Healing  Goal: Remain free from signs and symptoms of infection  Outcome: PROGRESSING AS EXPECTED  Note: Pt has remained free from s/sx of infection      Problem: Pain  Goal: Alleviation of Pain or a reduction in pain to the patient's comfort goal  Outcome: PROGRESSING AS EXPECTED  Note: Pt knows to let RN know if she is in any pain

## 2020-03-06 NOTE — PROGRESS NOTES
Presents for scheduled iron transfusion. Denies painful, regular UCs, LOF, or VB; reports good FM. SL inserted and Dr. Thompson called to get orders. RN talked with pharmacy to get appropriate orders  1845: Labs still in process to determine if pt candidate for transfusion  1900: report given to Yaquelin MAHER

## 2020-03-06 NOTE — PROGRESS NOTES
IV Iron Per Pharmacy Note    Patient Lean Body Weight:  55 kg  Reason for Iron Replacement:anemia in the setting of pregnancy (35 weeks)       Lab Results   Component Value Date/Time    WBC 8.0 03/05/2020 05:20 PM    RBC 3.60 (L) 03/05/2020 05:20 PM    HEMOGLOBIN 9.7 (L) 03/05/2020 05:20 PM    HEMATOCRIT 30.5 (L) 03/05/2020 05:20 PM    MCV 84.7 03/05/2020 05:20 PM    MCH 26.9 (L) 03/05/2020 05:20 PM    MCHC 31.8 (L) 03/05/2020 05:20 PM    MPV 11.3 03/05/2020 05:20 PM       Recent Labs     03/05/20  1749   FERRITIN 4.0*   IRON 137          Assessment/Plan:  1. IV Iron Indicated.   2. Give Iron Dextran 25 mg IV test dose following diphenhydramine/acetaminophen premeds over 30 minutes per protocol.  3. If no reaction (Anaphylaxis, Hypotension/Hypertension, N/V/D, Chest pain/Back Pain, Urticaria/Pruritis) in the next hour, proceed to full dose. Nursing to call the pharmacy IV room at ext. [phone] for full dose.  4. Full dose: Iron Dextran 1300 mg IV over 4 hours. Continue to monitor for delayed ADR including: Arthralgia/myalgia, Headache/backache, chills/dizziness/malaise, moderate to high fever and n/v.      Jovan Phillips, PharmD, BCCCP

## 2020-03-06 NOTE — PROGRESS NOTES
Report received, assessment done. Spoke to Dr Kraus re BPP before discharging pt.  0812 Dr Kraus in with US to do BPP. Dr Kraus gave orders to discharge pt.  0930 Pt had breakfast and discharge instructions given. Pt is awaiting her .  1000 pt left with  in stable condition with her .

## 2020-03-24 ENCOUNTER — HOSPITAL ENCOUNTER (INPATIENT)
Facility: MEDICAL CENTER | Age: 40
LOS: 1 days | End: 2020-03-25
Attending: OBSTETRICS & GYNECOLOGY | Admitting: OBSTETRICS & GYNECOLOGY
Payer: COMMERCIAL

## 2020-03-24 ENCOUNTER — ANESTHESIA EVENT (OUTPATIENT)
Dept: ANESTHESIOLOGY | Facility: MEDICAL CENTER | Age: 40
End: 2020-03-24
Payer: COMMERCIAL

## 2020-03-24 ENCOUNTER — ANESTHESIA (OUTPATIENT)
Dept: ANESTHESIOLOGY | Facility: MEDICAL CENTER | Age: 40
End: 2020-03-24
Payer: COMMERCIAL

## 2020-03-24 ENCOUNTER — APPOINTMENT (OUTPATIENT)
Dept: RADIOLOGY | Facility: MEDICAL CENTER | Age: 40
End: 2020-03-24
Attending: OBSTETRICS & GYNECOLOGY
Payer: COMMERCIAL

## 2020-03-24 LAB
APPEARANCE UR: ABNORMAL
BACTERIA #/AREA URNS HPF: ABNORMAL /HPF
BASOPHILS # BLD AUTO: 0.1 % (ref 0–1.8)
BASOPHILS # BLD: 0.01 K/UL (ref 0–0.12)
BILIRUB UR QL STRIP.AUTO: NEGATIVE
COLOR UR: ABNORMAL
EOSINOPHIL # BLD AUTO: 0.02 K/UL (ref 0–0.51)
EOSINOPHIL NFR BLD: 0.3 % (ref 0–6.9)
EPI CELLS #/AREA URNS HPF: ABNORMAL /HPF
ERYTHROCYTE [DISTWIDTH] IN BLOOD BY AUTOMATED COUNT: 56.8 FL (ref 35.9–50)
GLUCOSE UR STRIP.AUTO-MCNC: NEGATIVE MG/DL
HCT VFR BLD AUTO: 33.3 % (ref 37–47)
HGB BLD-MCNC: 10.7 G/DL (ref 12–16)
HOLDING TUBE BB 8507: NORMAL
HYALINE CASTS #/AREA URNS LPF: ABNORMAL /LPF
IMM GRANULOCYTES # BLD AUTO: 0.03 K/UL (ref 0–0.11)
IMM GRANULOCYTES NFR BLD AUTO: 0.4 % (ref 0–0.9)
KETONES UR STRIP.AUTO-MCNC: NEGATIVE MG/DL
LEUKOCYTE ESTERASE UR QL STRIP.AUTO: ABNORMAL
LYMPHOCYTES # BLD AUTO: 1.27 K/UL (ref 1–4.8)
LYMPHOCYTES NFR BLD: 16.3 % (ref 22–41)
MCH RBC QN AUTO: 27.6 PG (ref 27–33)
MCHC RBC AUTO-ENTMCNC: 32.1 G/DL (ref 33.6–35)
MCV RBC AUTO: 85.8 FL (ref 81.4–97.8)
MICRO URNS: ABNORMAL
MONOCYTES # BLD AUTO: 0.51 K/UL (ref 0–0.85)
MONOCYTES NFR BLD AUTO: 6.6 % (ref 0–13.4)
NEUTROPHILS # BLD AUTO: 5.94 K/UL (ref 2–7.15)
NEUTROPHILS NFR BLD: 76.3 % (ref 44–72)
NITRITE UR QL STRIP.AUTO: NEGATIVE
NRBC # BLD AUTO: 0 K/UL
NRBC BLD-RTO: 0 /100 WBC
PH UR STRIP.AUTO: 6.5 [PH] (ref 5–8)
PLATELET # BLD AUTO: 167 K/UL (ref 164–446)
PMV BLD AUTO: 10.7 FL (ref 9–12.9)
PROT UR QL STRIP: NEGATIVE MG/DL
RBC # BLD AUTO: 3.88 M/UL (ref 4.2–5.4)
RBC # URNS HPF: ABNORMAL /HPF
RBC UR QL AUTO: ABNORMAL
SP GR UR STRIP.AUTO: 1.02
UROBILINOGEN UR STRIP.AUTO-MCNC: 1 MG/DL
WBC # BLD AUTO: 7.8 K/UL (ref 4.8–10.8)
WBC #/AREA URNS HPF: ABNORMAL /HPF

## 2020-03-24 PROCEDURE — 3E033VJ INTRODUCTION OF OTHER HORMONE INTO PERIPHERAL VEIN, PERCUTANEOUS APPROACH: ICD-10-PCS | Performed by: OBSTETRICS & GYNECOLOGY

## 2020-03-24 PROCEDURE — 304965 HCHG RECOVERY SERVICES

## 2020-03-24 PROCEDURE — 10907ZC DRAINAGE OF AMNIOTIC FLUID, THERAPEUTIC FROM PRODUCTS OF CONCEPTION, VIA NATURAL OR ARTIFICIAL OPENING: ICD-10-PCS | Performed by: OBSTETRICS & GYNECOLOGY

## 2020-03-24 PROCEDURE — 85025 COMPLETE CBC W/AUTO DIFF WBC: CPT

## 2020-03-24 PROCEDURE — 59409 OBSTETRICAL CARE: CPT

## 2020-03-24 PROCEDURE — 36415 COLL VENOUS BLD VENIPUNCTURE: CPT

## 2020-03-24 PROCEDURE — 770002 HCHG ROOM/CARE - OB PRIVATE (112)

## 2020-03-24 PROCEDURE — 700102 HCHG RX REV CODE 250 W/ 637 OVERRIDE(OP): Performed by: OBSTETRICS & GYNECOLOGY

## 2020-03-24 PROCEDURE — 700105 HCHG RX REV CODE 258

## 2020-03-24 PROCEDURE — 700101 HCHG RX REV CODE 250: Performed by: ANESTHESIOLOGY

## 2020-03-24 PROCEDURE — 0HQ9XZZ REPAIR PERINEUM SKIN, EXTERNAL APPROACH: ICD-10-PCS | Performed by: OBSTETRICS & GYNECOLOGY

## 2020-03-24 PROCEDURE — 700111 HCHG RX REV CODE 636 W/ 250 OVERRIDE (IP)

## 2020-03-24 PROCEDURE — 303615 HCHG EPIDURAL/SPINAL ANESTHESIA FOR LABOR

## 2020-03-24 PROCEDURE — 76819 FETAL BIOPHYS PROFIL W/O NST: CPT

## 2020-03-24 PROCEDURE — 700111 HCHG RX REV CODE 636 W/ 250 OVERRIDE (IP): Performed by: OBSTETRICS & GYNECOLOGY

## 2020-03-24 PROCEDURE — A9270 NON-COVERED ITEM OR SERVICE: HCPCS | Performed by: OBSTETRICS & GYNECOLOGY

## 2020-03-24 PROCEDURE — 81001 URINALYSIS AUTO W/SCOPE: CPT

## 2020-03-24 RX ORDER — SODIUM CHLORIDE, SODIUM LACTATE, POTASSIUM CHLORIDE, AND CALCIUM CHLORIDE .6; .31; .03; .02 G/100ML; G/100ML; G/100ML; G/100ML
250 INJECTION, SOLUTION INTRAVENOUS PRN
Status: DISCONTINUED | OUTPATIENT
Start: 2020-03-24 | End: 2020-03-25 | Stop reason: HOSPADM

## 2020-03-24 RX ORDER — ACETAMINOPHEN 325 MG/1
325 TABLET ORAL EVERY 4 HOURS PRN
Status: DISCONTINUED | OUTPATIENT
Start: 2020-03-24 | End: 2020-03-25 | Stop reason: HOSPADM

## 2020-03-24 RX ORDER — IBUPROFEN 600 MG/1
600 TABLET ORAL EVERY 6 HOURS PRN
Status: DISCONTINUED | OUTPATIENT
Start: 2020-03-24 | End: 2020-03-25 | Stop reason: HOSPADM

## 2020-03-24 RX ORDER — MISOPROSTOL 200 UG/1
800 TABLET ORAL
Status: COMPLETED | OUTPATIENT
Start: 2020-03-24 | End: 2020-03-24

## 2020-03-24 RX ORDER — ROPIVACAINE HYDROCHLORIDE 2 MG/ML
INJECTION, SOLUTION EPIDURAL; INFILTRATION; PERINEURAL CONTINUOUS
Status: DISCONTINUED | OUTPATIENT
Start: 2020-03-24 | End: 2020-03-25 | Stop reason: HOSPADM

## 2020-03-24 RX ORDER — VITAMIN A ACETATE, BETA CAROTENE, ASCORBIC ACID, CHOLECALCIFEROL, .ALPHA.-TOCOPHEROL ACETATE, DL-, THIAMINE MONONITRATE, RIBOFLAVIN, NIACINAMIDE, PYRIDOXINE HYDROCHLORIDE, FOLIC ACID, CYANOCOBALAMIN, CALCIUM CARBONATE, FERROUS FUMARATE, ZINC OXIDE, CUPRIC OXIDE 3080; 12; 120; 400; 1; 1.84; 3; 20; 22; 920; 25; 200; 27; 10; 2 [IU]/1; UG/1; MG/1; [IU]/1; MG/1; MG/1; MG/1; MG/1; MG/1; [IU]/1; MG/1; MG/1; MG/1; MG/1; MG/1
1 TABLET, FILM COATED ORAL EVERY MORNING
Status: DISCONTINUED | OUTPATIENT
Start: 2020-03-25 | End: 2020-03-25 | Stop reason: HOSPADM

## 2020-03-24 RX ORDER — CARBOPROST TROMETHAMINE 250 UG/ML
250 INJECTION, SOLUTION INTRAMUSCULAR
Status: DISCONTINUED | OUTPATIENT
Start: 2020-03-24 | End: 2020-03-25 | Stop reason: HOSPADM

## 2020-03-24 RX ORDER — SODIUM CHLORIDE, SODIUM LACTATE, POTASSIUM CHLORIDE, AND CALCIUM CHLORIDE .6; .31; .03; .02 G/100ML; G/100ML; G/100ML; G/100ML
1000 INJECTION, SOLUTION INTRAVENOUS
Status: DISCONTINUED | OUTPATIENT
Start: 2020-03-24 | End: 2020-03-25 | Stop reason: HOSPADM

## 2020-03-24 RX ORDER — SODIUM CHLORIDE, SODIUM LACTATE, POTASSIUM CHLORIDE, CALCIUM CHLORIDE 600; 310; 30; 20 MG/100ML; MG/100ML; MG/100ML; MG/100ML
INJECTION, SOLUTION INTRAVENOUS PRN
Status: DISCONTINUED | OUTPATIENT
Start: 2020-03-24 | End: 2020-03-25 | Stop reason: HOSPADM

## 2020-03-24 RX ORDER — BISACODYL 10 MG
10 SUPPOSITORY, RECTAL RECTAL PRN
Status: DISCONTINUED | OUTPATIENT
Start: 2020-03-24 | End: 2020-03-25 | Stop reason: HOSPADM

## 2020-03-24 RX ORDER — SODIUM CHLORIDE, SODIUM LACTATE, POTASSIUM CHLORIDE, CALCIUM CHLORIDE 600; 310; 30; 20 MG/100ML; MG/100ML; MG/100ML; MG/100ML
INJECTION, SOLUTION INTRAVENOUS CONTINUOUS
Status: DISCONTINUED | OUTPATIENT
Start: 2020-03-24 | End: 2020-03-25 | Stop reason: HOSPADM

## 2020-03-24 RX ORDER — METHYLERGONOVINE MALEATE 0.2 MG/ML
0.2 INJECTION INTRAVENOUS
Status: DISCONTINUED | OUTPATIENT
Start: 2020-03-24 | End: 2020-03-25 | Stop reason: HOSPADM

## 2020-03-24 RX ORDER — MISOPROSTOL 200 UG/1
600 TABLET ORAL
Status: DISCONTINUED | OUTPATIENT
Start: 2020-03-24 | End: 2020-03-25 | Stop reason: HOSPADM

## 2020-03-24 RX ORDER — OXYCODONE HYDROCHLORIDE AND ACETAMINOPHEN 5; 325 MG/1; MG/1
1 TABLET ORAL EVERY 6 HOURS PRN
Status: DISCONTINUED | OUTPATIENT
Start: 2020-03-24 | End: 2020-03-25 | Stop reason: HOSPADM

## 2020-03-24 RX ORDER — ROPIVACAINE HYDROCHLORIDE 2 MG/ML
INJECTION, SOLUTION EPIDURAL; INFILTRATION; PERINEURAL
Status: COMPLETED
Start: 2020-03-24 | End: 2020-03-24

## 2020-03-24 RX ORDER — LIDOCAINE HYDROCHLORIDE AND EPINEPHRINE 15; 5 MG/ML; UG/ML
INJECTION, SOLUTION EPIDURAL
Status: COMPLETED | OUTPATIENT
Start: 2020-03-24 | End: 2020-03-24

## 2020-03-24 RX ORDER — DOCUSATE SODIUM 100 MG/1
100 CAPSULE, LIQUID FILLED ORAL 2 TIMES DAILY PRN
Status: DISCONTINUED | OUTPATIENT
Start: 2020-03-24 | End: 2020-03-25 | Stop reason: HOSPADM

## 2020-03-24 RX ORDER — SODIUM CHLORIDE, SODIUM LACTATE, POTASSIUM CHLORIDE, CALCIUM CHLORIDE 600; 310; 30; 20 MG/100ML; MG/100ML; MG/100ML; MG/100ML
INJECTION, SOLUTION INTRAVENOUS
Status: COMPLETED
Start: 2020-03-24 | End: 2020-03-24

## 2020-03-24 RX ORDER — OXYCODONE HYDROCHLORIDE 10 MG/1
10 TABLET ORAL EVERY 4 HOURS PRN
Status: DISCONTINUED | OUTPATIENT
Start: 2020-03-24 | End: 2020-03-25 | Stop reason: HOSPADM

## 2020-03-24 RX ORDER — IBUPROFEN 600 MG/1
600 TABLET ORAL EVERY 6 HOURS PRN
Status: DISCONTINUED | OUTPATIENT
Start: 2020-03-24 | End: 2020-03-24

## 2020-03-24 RX ORDER — METHYLERGONOVINE MALEATE 0.2 MG/ML
0.2 INJECTION INTRAVENOUS
Status: COMPLETED | OUTPATIENT
Start: 2020-03-24 | End: 2020-03-24

## 2020-03-24 RX ORDER — ONDANSETRON 4 MG/1
4 TABLET, ORALLY DISINTEGRATING ORAL EVERY 6 HOURS PRN
Status: DISCONTINUED | OUTPATIENT
Start: 2020-03-24 | End: 2020-03-25 | Stop reason: HOSPADM

## 2020-03-24 RX ORDER — OXYCODONE HYDROCHLORIDE AND ACETAMINOPHEN 5; 325 MG/1; MG/1
1 TABLET ORAL EVERY 4 HOURS PRN
Status: DISCONTINUED | OUTPATIENT
Start: 2020-03-24 | End: 2020-03-24

## 2020-03-24 RX ORDER — ONDANSETRON 2 MG/ML
4 INJECTION INTRAMUSCULAR; INTRAVENOUS EVERY 6 HOURS PRN
Status: DISCONTINUED | OUTPATIENT
Start: 2020-03-24 | End: 2020-03-25 | Stop reason: HOSPADM

## 2020-03-24 RX ADMIN — SODIUM CHLORIDE, SODIUM LACTATE, POTASSIUM CHLORIDE, CALCIUM CHLORIDE 1000 ML: 600; 310; 30; 20 INJECTION, SOLUTION INTRAVENOUS at 14:40

## 2020-03-24 RX ADMIN — SODIUM CHLORIDE, POTASSIUM CHLORIDE, SODIUM LACTATE AND CALCIUM CHLORIDE 1000 ML: 600; 310; 30; 20 INJECTION, SOLUTION INTRAVENOUS at 15:59

## 2020-03-24 RX ADMIN — ONDANSETRON 4 MG: 2 INJECTION INTRAMUSCULAR; INTRAVENOUS at 20:20

## 2020-03-24 RX ADMIN — MISOPROSTOL 800 MCG: 200 TABLET ORAL at 19:52

## 2020-03-24 RX ADMIN — ACETAMINOPHEN 325 MG: 325 TABLET, FILM COATED ORAL at 14:39

## 2020-03-24 RX ADMIN — OXYTOCIN 125 ML/HR: 10 INJECTION, SOLUTION INTRAMUSCULAR; INTRAVENOUS at 20:41

## 2020-03-24 RX ADMIN — ROPIVACAINE HYDROCHLORIDE 200 MG: 2 INJECTION, SOLUTION EPIDURAL; INFILTRATION; PERINEURAL at 16:12

## 2020-03-24 RX ADMIN — LIDOCAINE HYDROCHLORIDE,EPINEPHRINE BITARTRATE 3 ML: 15; .005 INJECTION, SOLUTION EPIDURAL; INFILTRATION; INTRACAUDAL; PERINEURAL at 16:00

## 2020-03-24 RX ADMIN — METHYLERGONOVINE MALEATE 0.2 MG: 0.2 INJECTION, SOLUTION INTRAMUSCULAR; INTRAVENOUS at 19:53

## 2020-03-24 RX ADMIN — ROPIVACAINE HYDROCHLORIDE 200 MG: 2 INJECTION, SOLUTION EPIDURAL; INFILTRATION at 16:12

## 2020-03-24 RX ADMIN — OXYTOCIN 2 MILLI-UNITS/MIN: 10 INJECTION, SOLUTION INTRAMUSCULAR; INTRAVENOUS at 14:41

## 2020-03-24 RX ADMIN — IBUPROFEN 600 MG: 600 TABLET ORAL at 21:23

## 2020-03-24 RX ADMIN — SODIUM CHLORIDE, POTASSIUM CHLORIDE, SODIUM LACTATE AND CALCIUM CHLORIDE 1000 ML: 600; 310; 30; 20 INJECTION, SOLUTION INTRAVENOUS at 14:40

## 2020-03-24 RX ADMIN — OXYCODONE HYDROCHLORIDE 10 MG: 10 TABLET ORAL at 22:44

## 2020-03-24 SDOH — ECONOMIC STABILITY: FOOD INSECURITY: WITHIN THE PAST 12 MONTHS, YOU WORRIED THAT YOUR FOOD WOULD RUN OUT BEFORE YOU GOT MONEY TO BUY MORE.: NEVER TRUE

## 2020-03-24 SDOH — ECONOMIC STABILITY: FOOD INSECURITY: WITHIN THE PAST 12 MONTHS, THE FOOD YOU BOUGHT JUST DIDN'T LAST AND YOU DIDN'T HAVE MONEY TO GET MORE.: NEVER TRUE

## 2020-03-24 SDOH — ECONOMIC STABILITY: TRANSPORTATION INSECURITY
IN THE PAST 12 MONTHS, HAS THE LACK OF TRANSPORTATION KEPT YOU FROM MEDICAL APPOINTMENTS OR FROM GETTING MEDICATIONS?: NO

## 2020-03-24 SDOH — ECONOMIC STABILITY: TRANSPORTATION INSECURITY
IN THE PAST 12 MONTHS, HAS LACK OF TRANSPORTATION KEPT YOU FROM MEETINGS, WORK, OR FROM GETTING THINGS NEEDED FOR DAILY LIVING?: NO

## 2020-03-24 ASSESSMENT — PATIENT HEALTH QUESTIONNAIRE - PHQ9
1. LITTLE INTEREST OR PLEASURE IN DOING THINGS: NOT AT ALL
2. FEELING DOWN, DEPRESSED, IRRITABLE, OR HOPELESS: NOT AT ALL
SUM OF ALL RESPONSES TO PHQ9 QUESTIONS 1 AND 2: 0

## 2020-03-24 ASSESSMENT — LIFESTYLE VARIABLES
ALCOHOL_USE: NO
EVER_SMOKED: NEVER

## 2020-03-24 ASSESSMENT — FIBROSIS 4 INDEX
FIB4 SCORE: 1.14
FIB4 SCORE: 1.14

## 2020-03-24 NOTE — ANESTHESIA PREPROCEDURE EVALUATION
Relevant Problems   NEURO   (+) History of gestational diabetes         (+) Fatty liver      OB   (+) Gestational diabetes       Physical Exam    Airway   Mallampati: II  TM distance: >3 FB  Neck ROM: full       Cardiovascular - normal exam  Rhythm: regular  Rate: normal  (-) murmur     Dental - normal exam         Pulmonary - normal exam  Breath sounds clear to auscultation     Abdominal    Neurological - normal exam         Other findings: gravid            Anesthesia Plan    ASA 2       Plan - epidural   Neuraxial block will be labor analgesia              Pertinent diagnostic labs and testing reviewed    Informed Consent:    Anesthetic plan and risks discussed with patient.

## 2020-03-24 NOTE — PROGRESS NOTES
Report received from ERGAN Dye RN. Pt transferred to S221. IV started and admission profile completed. Dr Waters at bedside. Discussed POC with pt. Pt and FOB agree with POC. Report given to Marilyn MAHER.

## 2020-03-24 NOTE — PROGRESS NOTES
38.1 Denies VB, LOF or UC's    PT presenting for CO no FM since 0230 in AM. She stated prior to that she was having painful UC's every 10 mins. EFM and TOCO placed. Call to MD, orders for BPP and tracing reviewed.    1102 PT up to bathroom and US at BS.     1132 Call to MD with BPP and tracing dicussed, will admit for IOL

## 2020-03-24 NOTE — CARE PLAN
Problem: Pain  Goal: Alleviation of Pain or a reduction in pain to the patient's comfort goal  Outcome: PROGRESSING AS EXPECTED  Note: Pt reporting no pain at this time, will ask for epidural when ready.      Problem: Risk for Infection, Impaired Wound Healing  Goal: Remain free from signs and symptoms of infection  Outcome: PROGRESSING AS EXPECTED  Note: Currently showing no s/s of infection, monitoring temp

## 2020-03-24 NOTE — H&P
"Labor and Delivery History and Physical    CC:decreased fetal movement     HPI:Karolyn Hurley is a 38 yo  who presented to L&D at 38w1d with complaints of decreased fetal movement. She reported painful contractions every 10 minutes. Denied bleeding or LOF. She received her prenatal care with Dr Hannah this pregnancy. It was complicated by iron deficiency anemia of which she had an iron infusion at 35 weeks. Otherwise it was unremarkable. She had a normal anatomic scan by 24w3d demonstrating a male infant. She had a negative AFP Tetra.     All other systems were reviewed and were negative.    PMH:iron deficiency anemia  PSH:none  OB HX: 8 term vaginal deliveries between 5024-4323; 1 SAB  Gyn Hx:denies STI including HSV for herself or her spouse; denies abnormal pap smears  SH:denies T/E/D  Meds:PNV  Allergies:NKDA    /63   Pulse 92   Temp 36.6 °C (97.8 °F) (Temporal)   Ht 1.626 m (5' 4\")   Wt 79.4 kg (175 lb)   BMI 30.04 kg/m²     Physical Exam  Gen: NAD  Abd: gravid, non tender  Ext: no edema    FHT:130's, long stretches of minimal variability; occasional moderate variability; no accels, no decels  [place]: quiescent  SVE:1-2/50/-2    Laboratory Evaluation  ABO: O pos  GBS: neg  GTT:127  Rubella: Immune  HIV: Neg  RPR: NR  Hep sAg: neg    Assessment:   1. Term IUP at 38w1d  2. Decreased fetal movement. BPP 8/10.    Plan:  NST is not reassuring and given her advanced maternal age and knowledge this may be the first metric to change in fetal status, I do recommend IOL at this time.   Admit to L&D for IOL with misoprostol and then pitocin. May have epidural on demand.     Meliton Waters M.D.      "

## 2020-03-24 NOTE — ANESTHESIA PROCEDURE NOTES
Epidural Block  Date/Time: 3/24/2020 4:00 PM  Performed by: Lloyd Mathis M.D.  Authorized by: Lloyd Mathis M.D.     Patient Location:  OB  Start Time:  3/24/2020 4:00 PM  End Time:  3/24/2020 4:05 PM  Reason for Block: labor analgesia    patient identified, IV checked, site marked, risks and benefits discussed, surgical consent, monitors and equipment checked, pre-op evaluation and timeout performed    Patient Position:  Sitting  Prep: ChloraPrep, patient draped and sterile technique    Monitoring:  Blood pressure, continuous pulse oximetry and heart rate  Approach:  Midline  Location:  L3-L4  Injection Technique:  SARAY saline  Skin infiltration:  Lidocaine  Strength:  1%  Dose:  3ml  Needle Type:  Tuohy  Needle Gauge:  17 G  Needle Length:  3.5 in  Loss of resistance::  5  Catheter Size:  19 G  Catheter at Skin Depth:  12  Test Dose Result:  Negative

## 2020-03-24 NOTE — PROGRESS NOTES
1425: Assumed care of pt. Received report from RN. Completed admission, discussed POC, addressed all questions.  1430: SVE 3-4/50/-2 GAGANDEEP Sanchez RN. Received orders, started IV fluids and pit.   1556: Dr. Mathis at bedside to place epidural. Pt tolerated well.   1634: SVE 5-6/60/-1, GIFTY Henson RN & GAGANDEEP Sanchez RN.   1639: Dr Waters at bedside for AROM, small amount of clear fluid, FSE placed, SVE 6-7/60/-1.

## 2020-03-25 VITALS
DIASTOLIC BLOOD PRESSURE: 64 MMHG | OXYGEN SATURATION: 95 % | BODY MASS INDEX: 29.88 KG/M2 | TEMPERATURE: 97.4 F | HEART RATE: 61 BPM | SYSTOLIC BLOOD PRESSURE: 100 MMHG | WEIGHT: 175 LBS | HEIGHT: 64 IN | RESPIRATION RATE: 18 BRPM

## 2020-03-25 LAB
ERYTHROCYTE [DISTWIDTH] IN BLOOD BY AUTOMATED COUNT: 55.4 FL (ref 35.9–50)
HCT VFR BLD AUTO: 32.1 % (ref 37–47)
HGB BLD-MCNC: 10.6 G/DL (ref 12–16)
MCH RBC QN AUTO: 28 PG (ref 27–33)
MCHC RBC AUTO-ENTMCNC: 33 G/DL (ref 33.6–35)
MCV RBC AUTO: 84.7 FL (ref 81.4–97.8)
PLATELET # BLD AUTO: 158 K/UL (ref 164–446)
PMV BLD AUTO: 10.8 FL (ref 9–12.9)
RBC # BLD AUTO: 3.79 M/UL (ref 4.2–5.4)
WBC # BLD AUTO: 12.5 K/UL (ref 4.8–10.8)

## 2020-03-25 PROCEDURE — A9270 NON-COVERED ITEM OR SERVICE: HCPCS | Performed by: OBSTETRICS & GYNECOLOGY

## 2020-03-25 PROCEDURE — 85027 COMPLETE CBC AUTOMATED: CPT

## 2020-03-25 PROCEDURE — 700102 HCHG RX REV CODE 250 W/ 637 OVERRIDE(OP): Performed by: OBSTETRICS & GYNECOLOGY

## 2020-03-25 PROCEDURE — 36415 COLL VENOUS BLD VENIPUNCTURE: CPT

## 2020-03-25 RX ADMIN — ACETAMINOPHEN 325 MG: 325 TABLET, FILM COATED ORAL at 18:34

## 2020-03-25 RX ADMIN — OXYCODONE HYDROCHLORIDE 10 MG: 10 TABLET ORAL at 11:10

## 2020-03-25 RX ADMIN — OXYCODONE HYDROCHLORIDE AND ACETAMINOPHEN 1 TABLET: 5; 325 TABLET ORAL at 03:25

## 2020-03-25 RX ADMIN — IBUPROFEN 600 MG: 600 TABLET ORAL at 18:34

## 2020-03-25 RX ADMIN — IBUPROFEN 600 MG: 600 TABLET ORAL at 03:25

## 2020-03-25 RX ADMIN — VITAMIN A, VITAMIN C, VITAMIN D-3, VITAMIN E, VITAMIN B-1, VITAMIN B-2, NIACIN, VITAMIN B-6, CALCIUM, IRON, ZINC, COPPER 1 TABLET: 4000; 120; 400; 22; 1.84; 3; 20; 10; 1; 12; 200; 27; 25; 2 TABLET ORAL at 09:03

## 2020-03-25 ASSESSMENT — EDINBURGH POSTNATAL DEPRESSION SCALE (EPDS)
I HAVE BEEN SO UNHAPPY THAT I HAVE HAD DIFFICULTY SLEEPING: NOT AT ALL
I HAVE BEEN SO UNHAPPY THAT I HAVE HAD DIFFICULTY SLEEPING: NOT AT ALL
I HAVE BLAMED MYSELF UNNECESSARILY WHEN THINGS WENT WRONG: NOT VERY OFTEN
I HAVE BEEN SO UNHAPPY THAT I HAVE BEEN CRYING: ONLY OCCASIONALLY
I HAVE BEEN ABLE TO LAUGH AND SEE THE FUNNY SIDE OF THINGS: NOT QUITE SO MUCH NOW
I HAVE BEEN ABLE TO LAUGH AND SEE THE FUNNY SIDE OF THINGS: NOT QUITE SO MUCH NOW
I HAVE BLAMED MYSELF UNNECESSARILY WHEN THINGS WENT WRONG: NOT VERY OFTEN
THE THOUGHT OF HARMING MYSELF HAS OCCURRED TO ME: NEVER
THINGS HAVE BEEN GETTING ON TOP OF ME: NO, I HAVE BEEN COPING AS WELL AS EVER
I HAVE LOOKED FORWARD WITH ENJOYMENT TO THINGS: AS MUCH AS I EVER DID
I HAVE BEEN ANXIOUS OR WORRIED FOR NO GOOD REASON: HARDLY EVER
I HAVE FELT SAD OR MISERABLE: NO, NOT AT ALL
I HAVE BEEN SO UNHAPPY THAT I HAVE BEEN CRYING: ONLY OCCASIONALLY
THE THOUGHT OF HARMING MYSELF HAS OCCURRED TO ME: NEVER
I HAVE BEEN ANXIOUS OR WORRIED FOR NO GOOD REASON: YES, SOMETIMES
I HAVE FELT SAD OR MISERABLE: NO, NOT AT ALL
I HAVE FELT SCARED OR PANICKY FOR NO GOOD REASON: NO, NOT AT ALL
I HAVE LOOKED FORWARD WITH ENJOYMENT TO THINGS: AS MUCH AS I EVER DID
I HAVE FELT SCARED OR PANICKY FOR NO GOOD REASON: NO, NOT AT ALL
THINGS HAVE BEEN GETTING ON TOP OF ME: NO, I HAVE BEEN COPING AS WELL AS EVER

## 2020-03-25 ASSESSMENT — PAIN SCALES - GENERAL: PAIN_LEVEL: 0

## 2020-03-25 NOTE — CARE PLAN
Problem: Alteration in comfort related to episiotomy, vaginal repair and/or after birth pains  Goal: Patient verbalizes acceptable pain level  Outcome: PROGRESSING AS EXPECTED  Note: Pain controlled with prn medications per mar. Pt will call to request pain medications. Will offer pain medications as they become available. Pain management expectations discussed with pt, plan made for the day regarding how to address pain.      Problem: Potential knowledge deficit related to lack of understanding of self and  care  Goal: Patient will demonstrate ability to care for self and infant  Outcome: PROGRESSING AS EXPECTED  Note: Pt able to care for self and infant, FOB at bedside.

## 2020-03-25 NOTE — PROGRESS NOTES
Dr. Kraus called and notified that post delivery hemogram was drawn at 4 1/2 hours after delivery and WBC was 12.5, hemoglobin was 10.6, hematocrit 32.1 and platelets were 158. Dr. Kraus states hemogram does not need to be redrawn prior to discharge tonight.

## 2020-03-25 NOTE — ANESTHESIA POSTPROCEDURE EVALUATION
Patient: Karolyn Hurley    Procedure Summary     Date:  03/24/20 Room / Location:      Anesthesia Start:  1554 Anesthesia Stop:  1942    Procedure:  Labor Epidural Diagnosis:      Scheduled Providers:   Responsible Provider:  Lloyd Mathis M.D.    Anesthesia Type:  epidural ASA Status:  2          Final Anesthesia Type: epidural  Last vitals  BP   Blood Pressure: 122/72    Temp   36.8 °C (98.2 °F)    Pulse   Pulse: 65   Resp   18    SpO2   92 %      Anesthesia Post Evaluation    Patient location during evaluation: floor  Patient participation: complete - patient participated  Level of consciousness: awake and alert  Pain score: 0    Airway patency: patent  Anesthetic complications: no  Cardiovascular status: hemodynamically stable  Respiratory status: acceptable  Hydration status: euvolemic    PONV: none    patient able to participate, but full recovery from regional anesthesia has not occurred and is not expected within the stipulated timeframe for the completion of the evaluation       Nurse Pain Score: 6 (NPRS)

## 2020-03-25 NOTE — PROGRESS NOTES
OB Progress Note    Comfortable with epidural. Cat I strip. Baseline 140's. Moderate variability present. Accelerations present. Decelerations absent. Accelerations not present. Early decelerations present. Tocometer with contractions q4 min. SVE 6-7/80/-1. AROM clear. Continue pitocin.  Anticipate vaginal delivery.

## 2020-03-25 NOTE — L&D DELIVERY NOTE
"Delivery Summary    Karolyn Hurley is a 38 yo  who presented at 38w1d with decreased fetal movement. She was induced with pitocin for non reassuring fetal heart tones. She received an epidural for anesthesia. She underwent AROM. She progressed to complete dilation and with pushing efforts she underwent a spontaneous vaginal delivery of a viable male infant \"Rubén\" in  position with APGARS 8/9. The head delivered atraumatically. The anterior shoulder delivered with gentle downward pressure and the remainder of the body followed. The infant was placed on the maternal chest. The cord was clamped and cut after a 30 second delay. Pitocin was administered. The placenta was delivered with gentle pressure. The uterus firmed with fundal pressure and pitocin. The perineum was examined and a first degree laceration was repaired with 3.0 chromic in the usual fashion. Methergine IM and misoprostol IL were administered prophylactically.    EBL: 300cc  Anesthesia: epidural  Complications: None    Meliton Waters M.D.    "

## 2020-03-25 NOTE — CARE PLAN
Problem: Knowledge Deficit  Goal: Patient/Support person demonstrates understanding regarding the progression of labor, available options and participates in decision-making process  Outcome: PROGRESSING AS EXPECTED  Note: Discussed POC and medications with pt, pt verbalized understanding.       Problem: Risk for injury  Goal: Patient and fetus will be free of preventable injury/complications  Outcome: PROGRESSING AS EXPECTED  Note: EFM and TOCO in place to monitor for fetal wellbeing and uterine activity.

## 2020-03-25 NOTE — PROGRESS NOTES
"OB Post Partum  Note    Pain controlled. Normal lochia. Ambulating, ,and voiding without difficulty. Breast feeding.     /72   Pulse 65   Temp 36.8 °C (98.2 °F) (Temporal)   Resp 18   Ht 1.626 m (5' 4.02\")   Wt 79.4 kg (175 lb)   SpO2 92%   Breastfeeding Yes   BMI 30.02 kg/m²     Gen: NAD, resting comfortably  GI: soft, non tender to palpation  : fundus firm and below umbilicus  Ext: no edema    Recent Labs     20  1600 20  0016   WBC 7.8 12.5*   RBC 3.88* 3.79*   HEMOGLOBIN 10.7* 10.6*   HEMATOCRIT 33.3* 32.1*   MCV 85.8 84.7   MCH 27.6 28.0   RDW 56.8* 55.4*   PLATELETCT 167 158*   MPV 10.7 10.8   NEUTSPOLYS 76.30*  --    LYMPHOCYTES 16.30*  --    MONOCYTES 6.60  --    EOSINOPHILS 0.30  --    BASOPHILS 0.10  --        Assessment:  Post partum day #1 s/p     Plan:  Routine post partum care  Patient desires discharge PPD#2    Meliton Waters M.D.  "

## 2020-03-25 NOTE — ANESTHESIA QCDR
2019 [facility] Clinical Data Registry (for Quality Improvement)     Postoperative nausea/vomiting risk protocol (Adult = 18 yrs and Pediatric 3-17 yrs)- (430 and 463)  General inhalation anesthetic (NOT TIVA) with PONV risk factors: No  Provision of anti-emetic therapy with at least 2 different classes of agents: N/A  Patient DID NOT receive anti-emetic therapy and reason is documented in Medical Record: N/A    Multimodal Pain Management- (477)  Non-emergent surgery AND patient age >= 18: Yes  Use of Multimodal Pain Management, two or more drugs and/or interventions, NOT including systemic opioids: Yes  Exception: Documented allergy to multiple classes of analgesics: N/A    Smoking Abstinence (404)  Patient is current smoker (cigarette, pipe, e-cig, marijuanna): No  Elective Surgery:   Abstinence instructions provided prior to day of surgery:   Patient abstained from smoking on day of surgery:     Pre-Op Beta-Blocker in Isolated CABG (44)  Isolated CABG AND patient age >= 18: No  Beta-blocker admin within 24 hours of surgical incision:   Exception:of medical reason(s) for not administering beta blocker within 24 hours prior to surgical incision (e.g., not  indicated,other medical reason):     PACU assessment of acute postoperative pain prior to Anesthesia Care End- Applies to Patients Age = 18- (ABG7)  Initial PACU pain score is which of the following: < 7/10  Patient unable to report pain score: N/A    Post-anesthetic transfer of care checklist/protocol to PACU/ICU- (426 and 427)  Upon conclusion of case, patient transferred to which of the following locations: PACU/Non-ICU  Use of transfer checklist/protocol: Yes  Exclusion: Service Performed in Patient Hospital Room (and thus did not require transfer): N/A  Unplanned admission to ICU related to anesthesia service up through end of PACU care- (MD51)  Unplanned admission to ICU (not initially anticipated at anesthesia start time): No

## 2020-03-25 NOTE — ANESTHESIA TIME REPORT
Anesthesia Start and Stop Event Times     Date Time Event    3/24/2020 1550 Ready for Procedure     1554 Anesthesia Start     1942 Anesthesia Stop        Responsible Staff  03/24/20    Name Role Begin End    Lloyd Mathis M.D. Anesth 1554 1942        Preop Diagnosis (Free Text):  Pre-op Diagnosis             Preop Diagnosis (Codes):    Post op Diagnosis  Pregnant      Premium Reason  A. 3PM - 7AM    Comments:

## 2020-03-25 NOTE — CARE PLAN
Problem: Communication  Goal: The ability to communicate needs accurately and effectively will improve  Outcome: PROGRESSING AS EXPECTED     Problem: Safety  Goal: Will remain free from injury  Outcome: PROGRESSING AS EXPECTED  Goal: Will remain free from falls  Outcome: PROGRESSING AS EXPECTED     Problem: Infection  Goal: Will remain free from infection  Outcome: PROGRESSING AS EXPECTED     Problem: Knowledge Deficit  Goal: Knowledge of disease process/condition, treatment plan, diagnostic tests, and medications will improve  Outcome: PROGRESSING AS EXPECTED  Goal: Knowledge of the prescribed therapeutic regimen will improve  Outcome: PROGRESSING AS EXPECTED     Problem: Safety  Goal: Free from accidental injury  Outcome: PROGRESSING AS EXPECTED  Goal: Free from intentional harm  Outcome: PROGRESSING AS EXPECTED  Goal: Free from self harm  Outcome: PROGRESSING AS EXPECTED     Problem: Psychosocial needs  Goal: Spiritual needs incorporated in hospitalization  Outcome: PROGRESSING AS EXPECTED  Goal: Cultural needs incorporated in hospitalization  Outcome: PROGRESSING AS EXPECTED

## 2020-03-25 NOTE — PROGRESS NOTES
1900 - Report received from Laura MAHER. Pt resting comfortably in bed. Lines verified. Adams in place. POC discussed. Assessment complete.   1923 - Pt complete. Dr Waters called to room for delivery.   1935 - Viable baby boy delivered. APGARs 8/9. Baby placed skin to skin with pt.   2135 - Pt ambulated to bathroom in stable condition at this time. Pt able to void. Dinah pads changed.  2150 - Pt transferred to  in stable condition via wheel chair with infant in arms. Report given to Saba MAHER. Infant bands matched x2 cuddles active.

## 2020-03-25 NOTE — PROGRESS NOTES
Pt. Arrived by wheelchair to postpartum  with belongings to room 312, received report from Joann MAHER. Pt. Doing well. Funds firm, light belle/rubra. Pt oriented to room, emergency call light and infant security. Pt. Aware of dangers of sleeping with infant. Call light within reach.

## 2020-03-25 NOTE — PROGRESS NOTES
Assumed care of patient at 0700.  Report received at bedside.  Patient is A&O x 4, up self, denies needs, expected discharge today.

## 2020-03-25 NOTE — DISCHARGE INSTRUCTIONS
POSTPARTUM DISCHARGE INSTRUCTIONS FOR MOM    YOB: 1980   Age: 39 y.o.               Admit Date: 3/24/2020     Discharge Date: 3/25/2020  Attending Doctor:  Renita Hannah M.D.                  Allergies:  Nkda [no known drug allergy]    Discharged to home by car. Discharged via wheelchair, hospital escort: Yes.  Special equipment needed: Not Applicable  Belongings with: Personal  Be sure to schedule a follow-up appointment with your primary care doctor or any specialists as instructed.     Discharge Plan:   Diet Plan: Discussed  Activity Level: Discussed  Confirmed Follow up Appointment: Patient to Call and Schedule Appointment  Confirmed Symptoms Management: Discussed  Medication Reconciliation Updated: Yes  Influenza Vaccine Indication: Not indicated: Previously immunized this influenza season and > 8 years of age    REASONS TO CALL YOUR OBSTETRICIAN:  1.   Persistent fever or shaking chills (Temperature higher than 100.4)  2.   Heavy bleeding (soaking more than 1 pad per hour); Passing clots  3.   Foul odor from vagina  4.   Mastitis (Breast infection; breast pain, chills, fever, redness)  5.   Urinary pain, burning or frequency  6.   Episiotomy infection  7.   Abdominal incision infection  8.   Severe depression longer than 24 hours    HAND WASHING  · Prior to handling the baby.  · Before breastfeeding or bottle feeding baby.  · After using the bathroom or changing the baby's diaper.    WOUND CARE  Ask your physician for additional care instructions.  In general:    ·  Incision:      · Keep clean and dry.    · Do NOT lift anything heavier than your baby for up to 6 weeks.    · There should not be any opening or pus.      VAGINAL CARE  · Nothing inside vagina for 6 weeks: no sexual intercourse, tampons or douching.  · Bleeding may continue for 2-4 weeks.  Amount may vary.    · Call your physician for heavy bleeding which means soaking more than 1 pad per hour    BIRTH CONTROL  · It is  "possible to become pregnant at any time after delivery and while breastfeeding.  · Plan to discuss a method of birth control with your physician at your follow up visit. visit.    DIET AND ELIMINATION  · Eating more fiber (bran cereal, fruits, and vegetables) and drinking plenty of fluids will help to avoid constipation.  · Urinary frequency after childbirth is normal.    POSTPARTUM BLUES  During the first few days after birth, you may experience a sense of the \"blues\" which may include impatience, irritability or even crying.  These feeling come and go quickly.  However, as many as 1 in 10 women experience emotional symptoms known as postpartum depression.    Postpartum depression:  May start as early as the second or third day after delivery or take several weeks or months to develop.  Symptoms of \"blues\" are present, but are more intense:  Crying spells; loss of appetite; feelings of hopelessness or loss of control; fear of touching the baby; over concern or no concern at all about the baby; little or no concern about your own appearance/caring for yourself; and/or inability to sleep or excessive sleeping.  Contact your physician if you are experiencing any of these symptoms.    Crisis Hotline:  · [place] Crisis Hotline:  7-370-IPZIXVG  Or 1-914.782.7104  · [place] Crisis Hotline:  1-907.906.1137  Or 965-658-8330    PREVENTING SHAKEN BABY:  If you are angry or stressed, PUT THE BABY IN THE CRIB, step away, take some deep breaths, and wait until you are calm to care for the baby.  DO NOT SHAKE THE BABY.  You are not alone, call a supporter for help.    · Crisis [facility] Center 24/7 crisis line 389-211-2683 or 1-605.918.4242  · You can also text them, text \"ANSWER\" to 432911    QUIT SMOKING/TOBACCO USE:  I understand the use of any tobacco products increases my chance of suffering from future heart disease and could cause other illnesses which may shorten my life. Quitting the use of tobacco products is the single most " important thing I can do to improve my health. For further information on smoking / tobacco cessation call a Toll Free Quit Line at 1-683.863.5218 (*National [employer] [place]) or 1-937.231.9773 (American Lung Association) or you can access the web based program at www.lungusa.org.    · [place] Tobacco Users Help Line:  (177) 611-6549       Toll Free: 1-377.700.7269  · Quit Tobacco Program [facility] (797)100-6690    DEPRESSION / SUICIDE RISK:  As you are discharged from this [facility], it is important to learn how to keep safe from harming yourself.    Recognize the warning signs:  · Abrupt changes in personality, positive or negative- including increase in energy   · Giving away possessions  · Change in eating patterns- significant weight changes-  positive or negative  · Change in sleeping patterns- unable to sleep or sleeping all the time   · Unwillingness or inability to communicate  · Depression  · Unusual sadness, discouragement and loneliness  · Talk of wanting to die  · Neglect of personal appearance   · Rebelliousness- reckless behavior  · Withdrawal from people/activities they love  · Confusion- inability to concentrate     If you or a loved one observes any of these behaviors or has concerns about self-harm, here's what you can do:  · Talk about it- your feelings and reasons for harming yourself  · Remove any means that you might use to hurt yourself (examples: pills, rope, extension cords, firearm)  · Get professional help from the community (Mental Health, Substance Abuse, psychological counseling)  · Do not be alone:Call your Safe Contact- someone whom you trust who will be there for you.  · Call your local CRISIS HOTLINE 869-5730 or 854-874-2409  · Call your local Children's Mobile Crisis Response Team [place] (075) 209-8020 or www.[employer]  · Call the toll free National Suicide Prevention Hotlines   · National Suicide Prevention Lifeline 528-176-XGLJ  (5782)  · [facility] 800-SUICIDE (590-4539)    DISCHARGE SURVEY:  Thank you for choosing [facility].  We hope we provided you with very good care.  You may be receiving a survey in the mail.  Please fill it out.  Your opinion is valuable to us.    ADDITIONAL EDUCATIONAL MATERIALS GIVEN TO PATIENT:        My signature on this form indicates that:  1.  I have reviewed and understand the above information  2.  My questions regarding this information have been answered to my satisfaction.  3.  I have formulated a plan with my discharge nurse to obtain my prescribed medication for home.

## 2020-03-25 NOTE — PROGRESS NOTES
Patient had requested this morning to be discharged to home today when baby is 24 hours of age. Dr. Kraus called and notified that patient was given Roxicodone 10 mg tablet for complaint of uterine cramping at 1110. Patient was rating uterine cramping 7 out of 10. Patient now states that she feels nauseated and dizzy. Vital signs taken. Temperature is 97.8F temporal, pulse 100, respiratory rate 18 and blood pressure 119/63. Dr. Kraus states if her symptoms resolve that she may be discharged to home this evening.

## 2020-03-25 NOTE — PROGRESS NOTES
1745-pt starting to feel pain with epidural, bolus given  1824-pt requested to be repositioned due to pain not being relieved, another bolus given, epidural meds not going through epidural line, line replaced, bolus continued  1840-no relief, TC Dr Mathis, came in and gave a bolus, pt starting to feel relief  1900-report given to Shalonda MAHER, POC discussed

## 2020-03-26 NOTE — CONSULTS
Met with MOB for an initial lactation visit.  MOB delivered her 9th baby yesterday, 03/24/20, at 1942 at 38.1 weeks gestation.  MOB stated her feeding plan is to feed infant both breast milk and formula as she did in the past with her other 8 babies.  Lactation assistance was offered, but MOB declined.  MOB stated infant is feeding without difficultly and she denied pain and tissue damage to nipples and areolas with latch.    Provided MOB with outpatient lactation assistance available to [facility] through [facility] and the [facility] (when group sessions resume).    MOB instructed to call for lactation support as needed prior to discharge.

## 2020-03-26 NOTE — PROGRESS NOTES
2011: Patient discharged via wheelchair with infant. Infant placed in car seat by parents. Cuddles deactivated and bands verified.

## 2020-09-28 ENCOUNTER — OFFICE VISIT (OUTPATIENT)
Dept: MEDICAL GROUP | Age: 40
End: 2020-09-28
Payer: COMMERCIAL

## 2020-09-28 VITALS
TEMPERATURE: 98 F | HEIGHT: 64 IN | SYSTOLIC BLOOD PRESSURE: 110 MMHG | HEART RATE: 71 BPM | DIASTOLIC BLOOD PRESSURE: 62 MMHG | BODY MASS INDEX: 28.27 KG/M2 | OXYGEN SATURATION: 97 % | WEIGHT: 165.6 LBS

## 2020-09-28 DIAGNOSIS — R53.83 FATIGUE, UNSPECIFIED TYPE: ICD-10-CM

## 2020-09-28 DIAGNOSIS — Z00.00 ANNUAL PHYSICAL EXAM: ICD-10-CM

## 2020-09-28 DIAGNOSIS — E87.6 HYPOKALEMIA: ICD-10-CM

## 2020-09-28 DIAGNOSIS — K59.01 SLOW TRANSIT CONSTIPATION: ICD-10-CM

## 2020-09-28 DIAGNOSIS — Z12.39 SCREENING FOR MALIGNANT NEOPLASM OF BREAST: ICD-10-CM

## 2020-09-28 DIAGNOSIS — K76.0 FATTY LIVER: ICD-10-CM

## 2020-09-28 DIAGNOSIS — D64.9 ANEMIA, UNSPECIFIED TYPE: ICD-10-CM

## 2020-09-28 DIAGNOSIS — Z86.32 HISTORY OF GESTATIONAL DIABETES: ICD-10-CM

## 2020-09-28 DIAGNOSIS — D69.6 THROMBOCYTOPENIA (HCC): ICD-10-CM

## 2020-09-28 DIAGNOSIS — E55.9 HYPOVITAMINOSIS D: ICD-10-CM

## 2020-09-28 DIAGNOSIS — Z00.00 HEALTH CARE MAINTENANCE: ICD-10-CM

## 2020-09-28 DIAGNOSIS — N81.4 CYSTOCELE WITH UTERINE PROLAPSE: ICD-10-CM

## 2020-09-28 PROBLEM — J20.9 ACUTE BRONCHITIS: Status: RESOLVED | Noted: 2019-01-15 | Resolved: 2020-09-28

## 2020-09-28 PROBLEM — O03.9 SPONTANEOUS ABORTION: Status: RESOLVED | Noted: 2019-05-04 | Resolved: 2020-09-28

## 2020-09-28 PROBLEM — O24.419 GESTATIONAL DIABETES: Status: RESOLVED | Noted: 2019-05-04 | Resolved: 2020-09-28

## 2020-09-28 PROCEDURE — 99214 OFFICE O/P EST MOD 30 MIN: CPT | Mod: 25 | Performed by: INTERNAL MEDICINE

## 2020-09-28 PROCEDURE — 99396 PREV VISIT EST AGE 40-64: CPT | Performed by: INTERNAL MEDICINE

## 2020-09-28 ASSESSMENT — ANXIETY QUESTIONNAIRES
6. BECOMING EASILY ANNOYED OR IRRITABLE: NOT AT ALL
GAD7 TOTAL SCORE: 3
1. FEELING NERVOUS, ANXIOUS, OR ON EDGE: SEVERAL DAYS
5. BEING SO RESTLESS THAT IT IS HARD TO SIT STILL: NOT AT ALL
4. TROUBLE RELAXING: SEVERAL DAYS
2. NOT BEING ABLE TO STOP OR CONTROL WORRYING: NOT AT ALL
7. FEELING AFRAID AS IF SOMETHING AWFUL MIGHT HAPPEN: SEVERAL DAYS
3. WORRYING TOO MUCH ABOUT DIFFERENT THINGS: NOT AT ALL

## 2020-09-28 ASSESSMENT — FIBROSIS 4 INDEX: FIB4 SCORE: 1.26

## 2020-09-28 ASSESSMENT — PATIENT HEALTH QUESTIONNAIRE - PHQ9: CLINICAL INTERPRETATION OF PHQ2 SCORE: 0

## 2020-09-28 NOTE — PROGRESS NOTES
CHIEF COMPLAINT  Chief Complaint   Patient presents with   • Establish Care, general, anemia     HPI  Karolyn Hurley is a 40 y.o. female who presents today for the following     [facility]  Recommendations:  Regular exercise at least 4 days a week  Diet: advised balanced diet  Dental exam at least 1-2 times per year  Sunscreen use: advised    Immunization counseling:  TdaP:  UTD  Influenza: NA in office, advised    GYN  Previous PAP:  2019, normal   Abnormal PAP: no  Last Mammography: ordered    Menses irregular, for 3-5 days  No excess cramping or bleeding.   Takes OTC analgesics for cramping  Contraception:  None    Fatty liver  Found on US RUQ, 10/20/2018  Increased liver echotexture which suggests fatty infiltration or hepatocellular disease. Liver length is 15.3 cm on current exam compared to 17.8 cm on previous exam.  Lipid panel was within normal limits in 12/2018    History of gestational diabetes  Treated with diet.    Constipation  The patient has history of constipation no prescription medication.    Cystocele, uterine prolapse  Evaluated and followed by OB/GYN.    Anemia/thrombocytopenia, fatigue  In the last CBC, patient had normal hemoglobin, MCV and anemia.  Complains of fatigue    Hypokalemia  Last CMP showed borderline low potassium level.  She has not been on diuretic, no vomiting or diarrhea.  Renal panel has been normal.     Hypovitaminosis D  The patient had low vitamin D level.  Vitamin D supplement: On prenatal multivitamins.    Reviewed PMH, PSH, FH, SH, ALL, HCM/IMM, updated  Reviewed MEDS    Patient Active Problem List    Diagnosis Date Noted   • Cystocele with uterine prolapse 01/15/2019   • Fatty liver 05/02/2018   • Slow transit constipation 09/26/2017   • History of gestational diabetes 09/26/2017     CURRENT MEDICATIONS  Current Outpatient Medications   Medication Sig Dispense Refill   • Prenatal Multivit-Min-Fe-FA (PRENATAL VITAMINS PO) Take 1 Tab by mouth every day.       No  "current facility-administered medications for this visit.      ALLERGIES  Allergies: Nkda [no known drug allergy]  PAST MEDICAL HISTORY  Past Medical History:   Diagnosis Date   • Gestational diabetes 2019     SURGICAL HISTORY  She  has a past surgical history that includes dilation and curettage (N/A, 2019).  SOCIAL HISTORY  Social History     Tobacco Use   • Smoking status: Never Smoker   • Smokeless tobacco: Never Used   Substance Use Topics   • Alcohol use: No   • Drug use: No     Social History     Social History Narrative   • Not on file     FAMILY HISTORY  Family History   Problem Relation Age of Onset   • No Known Problems Mother    • No Known Problems Father    • Diabetes Neg Hx    • Cancer Neg Hx    • Heart Disease Neg Hx    • Hypertension Neg Hx    • Hyperlipidemia Neg Hx    • Stroke Neg Hx      Family Status   Relation Name Status   • Mo  Alive   • Fa  Alive   • MGMo     • MGFa     • PGMo     • PGFa     • Sis 6 Alive   • Bro 7 Alive   • Neg Hx  (Not Specified)     ROS   Constitutional: Negative for fever, chills.  HENT: Negative for congestion, sore throat.  Eyes: Negative for vision problems.   Respiratory: Negative for cough, shortness of breath.  Cardiovascular: Negative for chest pain, palpitations.   Gastrointestinal: Negative for heartburn, nausea, abdominal pain.   Genitourinary: Negative for dysuria.  Musculoskeletal: Negative for significant myalgia, back and joint pain.   Skin: Negative for rash.   Neuro: Negative for dizziness, weakness and headaches.   Endo/Heme/Allergies: Does not bruise/bleed easily.   Psychiatric/Behavioral: Negative for depression.    PHYSICAL EXAM   Blood Pressure 110/62 (BP Location: Left arm, Patient Position: Sitting, BP Cuff Size: Adult)   Pulse 71   Temperature 36.7 °C (98 °F) (Temporal)   Height 1.626 m (5' 4\")   Weight 75.1 kg (165 lb 9.6 oz)   Oxygen Saturation 97%  Body mass index is 28.43 kg/m².  General:  NAD, well " appearing  HEENT:   NC/AT, PERRLA, EOMI.  Cardiovascular: unlabored breathing, no peripheral cyanosis or swelling.     Lungs:   no respiratory distress.  Abdomen: non- distended.  Extremities:  No LE swelling.  Skin:  Warm, dry.  No erythema. No rash.   Neurologic: Alert & oriented x 3. CN II-XII grossly intact. No focal deficits.  Psychiatric:  Affect normal, mood normal, judgment normal.    Labs     Labs are reviewed and discussed with a patient  Lab Results   Component Value Date/Time    CHOLSTRLTOT 162 12/13/2018 07:21 AM    LDL 88 12/13/2018 07:21 AM    HDL 56 12/13/2018 07:21 AM    TRIGLYCERIDE 88 12/13/2018 07:21 AM       Lab Results   Component Value Date/Time    SODIUM 137 05/04/2019 05:03 AM    POTASSIUM 3.4 (L) 05/04/2019 05:03 AM    CHLORIDE 106 05/04/2019 05:03 AM    CO2 22 05/04/2019 05:03 AM    GLUCOSE 108 (H) 05/04/2019 05:03 AM    BUN 10 05/04/2019 05:03 AM    CREATININE 0.57 05/04/2019 05:03 AM    CREATININE 0.8 01/16/2008 08:30 PM     Lab Results   Component Value Date/Time    ALKPHOSPHAT 59 12/13/2018 07:21 AM    ASTSGOT 15 12/13/2018 07:21 AM    ALTSGPT 10 12/13/2018 07:21 AM    TBILIRUBIN 0.5 12/13/2018 07:21 AM      Lab Results   Component Value Date/Time    HBA1C 6.0 (H) 10/04/2017 07:44 AM     No results found for: TSH  No results found for: FREET4    Lab Results   Component Value Date/Time    WBC 12.5 (H) 03/25/2020 12:16 AM    RBC 3.79 (L) 03/25/2020 12:16 AM    HEMOGLOBIN 10.6 (L) 03/25/2020 12:16 AM    HEMATOCRIT 32.1 (L) 03/25/2020 12:16 AM    MCV 84.7 03/25/2020 12:16 AM    MCH 28.0 03/25/2020 12:16 AM    MCHC 33.0 (L) 03/25/2020 12:16 AM    MPV 10.8 03/25/2020 12:16 AM    NEUTSPOLYS 76.30 (H) 03/24/2020 04:00 PM    LYMPHOCYTES 16.30 (L) 03/24/2020 04:00 PM    MONOCYTES 6.60 03/24/2020 04:00 PM    EOSINOPHILS 0.30 03/24/2020 04:00 PM    BASOPHILS 0.10 03/24/2020 04:00 PM    HYPOCHROMIA 1+ 06/19/2014 06:42 AM    ANISOCYTOSIS 1+ 02/03/2006 01:30 PM      Imaging     Reviewed and discussed  per HPI    Assessment and Plan     Karolyn Hurley is a 40 y.o. female    1. Annual physical exam  Reviewed PMH, PSH, FH, SH, ALL, MEDS, HCM/IMM.   Advised healthy habits, diet, exercise.    2. Health care maintenance  Per HPI    3. Screening for malignant neoplasm of breast  - MA-SCREENING MAMMO BILAT W/TOMOSYNTHESIS W/CAD; Future    4. Fatty liver  Liver enzymes were normal  - Comp Metabolic Panel; Future    5. History of gestational diabetes  Advised low-calorie diet, daily exercise, weight control    6. Slow transit constipation  Advised to increase fluids and fiber intake    7. Cystocele with uterine prolapse  No significant symptoms at this point    8. Anemia, unspecified type  Follow-up labs  - CBC WITH DIFFERENTIAL; Future  - IRON/TOTAL IRON BIND; Future  9. Thrombocytopenia (HCC)  - CBC WITH DIFFERENTIAL; Future    10. Fatigue, unspecified type  Follow-up labs  - TSH WITH REFLEX TO FT4; Future  11. Hypokalemia  - Comp Metabolic Panel; Future    12. Hypovitaminosis D  Continue current supplement, follow-up labs  - VITAMIN D,25 HYDROXY; Future    Counseling:   - Smoking:  Nonsmoker    Followup: Return in about 1 year (around 9/28/2021), or if symptoms worsen or fail to improve, for LABS, ANNUAL.    All questions are answered.    Please note that this dictation was created using voice recognition software, and that there might be errors of parish and possibly content.

## 2023-12-28 NOTE — TELEPHONE ENCOUNTER
LMOM for pt to call me at [facility] office     Please inform pharmacy that she can take any prenatal multivitamins.    Carmelita Covington M.D.

## 2024-01-26 ENCOUNTER — HOSPITAL ENCOUNTER (EMERGENCY)
Facility: MEDICAL CENTER | Age: 44
End: 2024-01-26
Attending: EMERGENCY MEDICINE
Payer: COMMERCIAL

## 2024-01-26 ENCOUNTER — APPOINTMENT (OUTPATIENT)
Dept: RADIOLOGY | Facility: MEDICAL CENTER | Age: 44
End: 2024-01-26
Attending: EMERGENCY MEDICINE
Payer: COMMERCIAL

## 2024-01-26 VITALS
OXYGEN SATURATION: 94 % | BODY MASS INDEX: 28.35 KG/M2 | RESPIRATION RATE: 18 BRPM | HEIGHT: 63 IN | SYSTOLIC BLOOD PRESSURE: 114 MMHG | WEIGHT: 160 LBS | DIASTOLIC BLOOD PRESSURE: 65 MMHG | TEMPERATURE: 98 F | HEART RATE: 96 BPM

## 2024-01-26 DIAGNOSIS — R07.89 ATYPICAL CHEST PAIN: ICD-10-CM

## 2024-01-26 DIAGNOSIS — F41.9 ANXIETY: ICD-10-CM

## 2024-01-26 LAB
ALBUMIN SERPL BCP-MCNC: 3.8 G/DL (ref 3.2–4.9)
ALBUMIN/GLOB SERPL: 1.1 G/DL
ALP SERPL-CCNC: 86 U/L (ref 30–99)
ALT SERPL-CCNC: 17 U/L (ref 2–50)
ANION GAP SERPL CALC-SCNC: 14 MMOL/L (ref 7–16)
AST SERPL-CCNC: 21 U/L (ref 12–45)
BASOPHILS # BLD AUTO: 0.3 % (ref 0–1.8)
BASOPHILS # BLD: 0.02 K/UL (ref 0–0.12)
BILIRUB SERPL-MCNC: 0.2 MG/DL (ref 0.1–1.5)
BUN SERPL-MCNC: 8 MG/DL (ref 8–22)
CALCIUM ALBUM COR SERPL-MCNC: 8.1 MG/DL (ref 8.5–10.5)
CALCIUM SERPL-MCNC: 7.9 MG/DL (ref 8.5–10.5)
CHLORIDE SERPL-SCNC: 109 MMOL/L (ref 96–112)
CO2 SERPL-SCNC: 17 MMOL/L (ref 20–33)
CREAT SERPL-MCNC: 0.51 MG/DL (ref 0.5–1.4)
D DIMER PPP IA.FEU-MCNC: 0.29 UG/ML (FEU) (ref 0–0.5)
EKG IMPRESSION: NORMAL
EOSINOPHIL # BLD AUTO: 0.06 K/UL (ref 0–0.51)
EOSINOPHIL NFR BLD: 0.8 % (ref 0–6.9)
ERYTHROCYTE [DISTWIDTH] IN BLOOD BY AUTOMATED COUNT: 41.8 FL (ref 35.9–50)
GFR SERPLBLD CREATININE-BSD FMLA CKD-EPI: 117 ML/MIN/1.73 M 2
GLOBULIN SER CALC-MCNC: 3.4 G/DL (ref 1.9–3.5)
GLUCOSE SERPL-MCNC: 106 MG/DL (ref 65–99)
HCG SERPL QL: NEGATIVE
HCT VFR BLD AUTO: 35.7 % (ref 37–47)
HGB BLD-MCNC: 11.7 G/DL (ref 12–16)
IMM GRANULOCYTES # BLD AUTO: 0.03 K/UL (ref 0–0.11)
IMM GRANULOCYTES NFR BLD AUTO: 0.4 % (ref 0–0.9)
LYMPHOCYTES # BLD AUTO: 0.54 K/UL (ref 1–4.8)
LYMPHOCYTES NFR BLD: 7.4 % (ref 22–41)
MCH RBC QN AUTO: 26.2 PG (ref 27–33)
MCHC RBC AUTO-ENTMCNC: 32.8 G/DL (ref 32.2–35.5)
MCV RBC AUTO: 79.9 FL (ref 81.4–97.8)
MONOCYTES # BLD AUTO: 0.62 K/UL (ref 0–0.85)
MONOCYTES NFR BLD AUTO: 8.5 % (ref 0–13.4)
NEUTROPHILS # BLD AUTO: 6.06 K/UL (ref 1.82–7.42)
NEUTROPHILS NFR BLD: 82.6 % (ref 44–72)
NRBC # BLD AUTO: 0 K/UL
NRBC BLD-RTO: 0 /100 WBC (ref 0–0.2)
PLATELET # BLD AUTO: 233 K/UL (ref 164–446)
PMV BLD AUTO: 10.5 FL (ref 9–12.9)
POTASSIUM SERPL-SCNC: 3.7 MMOL/L (ref 3.6–5.5)
PROT SERPL-MCNC: 7.2 G/DL (ref 6–8.2)
RBC # BLD AUTO: 4.47 M/UL (ref 4.2–5.4)
SODIUM SERPL-SCNC: 140 MMOL/L (ref 135–145)
TROPONIN T SERPL-MCNC: <6 NG/L (ref 6–19)
TROPONIN T SERPL-MCNC: <6 NG/L (ref 6–19)
WBC # BLD AUTO: 7.3 K/UL (ref 4.8–10.8)

## 2024-01-26 PROCEDURE — 84703 CHORIONIC GONADOTROPIN ASSAY: CPT

## 2024-01-26 PROCEDURE — 36415 COLL VENOUS BLD VENIPUNCTURE: CPT

## 2024-01-26 PROCEDURE — 80053 COMPREHEN METABOLIC PANEL: CPT

## 2024-01-26 PROCEDURE — 93005 ELECTROCARDIOGRAM TRACING: CPT | Performed by: EMERGENCY MEDICINE

## 2024-01-26 PROCEDURE — 71045 X-RAY EXAM CHEST 1 VIEW: CPT

## 2024-01-26 PROCEDURE — 700105 HCHG RX REV CODE 258: Performed by: EMERGENCY MEDICINE

## 2024-01-26 PROCEDURE — 84484 ASSAY OF TROPONIN QUANT: CPT | Mod: 91

## 2024-01-26 PROCEDURE — 85025 COMPLETE CBC W/AUTO DIFF WBC: CPT

## 2024-01-26 PROCEDURE — 99284 EMERGENCY DEPT VISIT MOD MDM: CPT

## 2024-01-26 PROCEDURE — 85379 FIBRIN DEGRADATION QUANT: CPT

## 2024-01-26 RX ORDER — LORAZEPAM 0.5 MG/1
0.5 TABLET ORAL EVERY 8 HOURS PRN
Qty: 8 TABLET | Refills: 0 | Status: SHIPPED | OUTPATIENT
Start: 2024-01-26 | End: 2024-01-30

## 2024-01-26 RX ORDER — SODIUM CHLORIDE, SODIUM LACTATE, POTASSIUM CHLORIDE, CALCIUM CHLORIDE 600; 310; 30; 20 MG/100ML; MG/100ML; MG/100ML; MG/100ML
1000 INJECTION, SOLUTION INTRAVENOUS ONCE
Status: COMPLETED | OUTPATIENT
Start: 2024-01-26 | End: 2024-01-26

## 2024-01-26 RX ADMIN — SODIUM CHLORIDE, POTASSIUM CHLORIDE, SODIUM LACTATE AND CALCIUM CHLORIDE 1000 ML: 600; 310; 30; 20 INJECTION, SOLUTION INTRAVENOUS at 15:37

## 2024-01-26 NOTE — ED TRIAGE NOTES
"Chief Complaint   Patient presents with    Chest Pain     X1 week but states the pain is worse today. Pt has associated SOB, no radiation       Pt BIB EMS for above. Pt given 324 of ASA and 0.8 nitro en route. Pt aox4 gcs 15      /67   Pulse (!) 124   Temp 36.7 °C (98 °F) (Temporal)   Resp 20   Ht 1.6 m (5' 3\")   Wt 72.6 kg (160 lb)   LMP 01/25/2024   SpO2 97%   BMI 28.34 kg/m²     "

## 2024-01-27 NOTE — ED PROVIDER NOTES
"ED Provider Note    CHIEF COMPLAINT  Chief Complaint   Patient presents with    Chest Pain     X1 week but states the pain is worse today. Pt has associated SOB, no radiation       EXTERNAL RECORDS REVIEWED  Outpatient Notes   surgery center, occupational health     HPI/ROS  LIMITATION TO HISTORY   Select: : None  OUTSIDE HISTORIAN(S):  Family.  States patient has been having intermittent pain for a week.    Karolyn Hurley is a 43 y.o. female who presents here for evaluation of chest pain.  She states that she does have some associated shortness of breath, and anxiety.  Patient has pain that is substernal, nonradiating, not associate with diaphoresis, and no abdominal pain.  She has no back pain.  No other medical concerns at this time.  Patient does have a \"a lot of stress in the home\" but no reported SI or HI.    PAST MEDICAL HISTORY   has a past medical history of Gestational diabetes (5/4/2019).    SURGICAL HISTORY   has a past surgical history that includes dilation and curettage (N/A, 5/4/2019).    FAMILY HISTORY  Family History   Problem Relation Age of Onset    No Known Problems Mother     No Known Problems Father     Diabetes Neg Hx     Cancer Neg Hx     Heart Disease Neg Hx     Hypertension Neg Hx     Hyperlipidemia Neg Hx     Stroke Neg Hx        SOCIAL HISTORY  Social History     Tobacco Use    Smoking status: Never    Smokeless tobacco: Never   Vaping Use    Vaping Use: Never used   Substance and Sexual Activity    Alcohol use: No    Drug use: No    Sexual activity: Yes     Partners: Male       CURRENT MEDICATIONS  Home Medications       Reviewed by Hedy Peacock R.N. (Registered Nurse) on 01/26/24 at 1358  Med List Status: Partial     Medication Last Dose Status   Prenatal Multivit-Min-Fe-FA (PRENATAL VITAMINS PO)  Active                    ALLERGIES  Allergies   Allergen Reactions    Nkda [No Known Drug Allergy]        PHYSICAL EXAM  VITAL SIGNS: /69   Pulse 92   Temp 36.7 °C (98 " "°F) (Temporal)   Resp 18   Ht 1.6 m (5' 3\")   Wt 72.6 kg (160 lb)   LMP 01/25/2024   SpO2 98%   BMI 28.34 kg/m²    Constitutional: Well developed, well nourished. No acute distress.  HEENT: Normocephalic, atraumatic. Posterior pharynx clear and moist.  Eyes:  EOMI. Normal sclera.  Neck: Supple, Full range of motion, nontender.  Chest/Pulmonary: clear to ausculation. Symmetrical expansion.   Cardio: Regular rate and rhythm with no murmur.   Abdomen: Soft, nontender. No peritoneal signs. No guarding. No palpable masses.  Back: No CVA tenderness, nontender midline, no step offs.  Musculoskeletal: No deformity, no edema, neurovascular intact.   Neuro: Clear speech, appropriate, cooperative, cranial nerves II-XII grossly intact.  Psych: Normal mood and affect      DIAGNOSTIC STUDIES / PROCEDURES  Results for orders placed or performed during the hospital encounter of 01/26/24   CBC with Differential   Result Value Ref Range    WBC 7.3 4.8 - 10.8 K/uL    RBC 4.47 4.20 - 5.40 M/uL    Hemoglobin 11.7 (L) 12.0 - 16.0 g/dL    Hematocrit 35.7 (L) 37.0 - 47.0 %    MCV 79.9 (L) 81.4 - 97.8 fL    MCH 26.2 (L) 27.0 - 33.0 pg    MCHC 32.8 32.2 - 35.5 g/dL    RDW 41.8 35.9 - 50.0 fL    Platelet Count 233 164 - 446 K/uL    MPV 10.5 9.0 - 12.9 fL    Neutrophils-Polys 82.60 (H) 44.00 - 72.00 %    Lymphocytes 7.40 (L) 22.00 - 41.00 %    Monocytes 8.50 0.00 - 13.40 %    Eosinophils 0.80 0.00 - 6.90 %    Basophils 0.30 0.00 - 1.80 %    Immature Granulocytes 0.40 0.00 - 0.90 %    Nucleated RBC 0.00 0.00 - 0.20 /100 WBC    Neutrophils (Absolute) 6.06 1.82 - 7.42 K/uL    Lymphs (Absolute) 0.54 (L) 1.00 - 4.80 K/uL    Monos (Absolute) 0.62 0.00 - 0.85 K/uL    Eos (Absolute) 0.06 0.00 - 0.51 K/uL    Baso (Absolute) 0.02 0.00 - 0.12 K/uL    Immature Granulocytes (abs) 0.03 0.00 - 0.11 K/uL    NRBC (Absolute) 0.00 K/uL   Complete Metabolic Panel (CMP)   Result Value Ref Range    Sodium 140 135 - 145 mmol/L    Potassium 3.7 3.6 - 5.5 mmol/L "    Chloride 109 96 - 112 mmol/L    Co2 17 (L) 20 - 33 mmol/L    Anion Gap 14.0 7.0 - 16.0    Glucose 106 (H) 65 - 99 mg/dL    Bun 8 8 - 22 mg/dL    Creatinine 0.51 0.50 - 1.40 mg/dL    Calcium 7.9 (L) 8.5 - 10.5 mg/dL    Correct Calcium 8.1 (L) 8.5 - 10.5 mg/dL    AST(SGOT) 21 12 - 45 U/L    ALT(SGPT) 17 2 - 50 U/L    Alkaline Phosphatase 86 30 - 99 U/L    Total Bilirubin 0.2 0.1 - 1.5 mg/dL    Albumin 3.8 3.2 - 4.9 g/dL    Total Protein 7.2 6.0 - 8.2 g/dL    Globulin 3.4 1.9 - 3.5 g/dL    A-G Ratio 1.1 g/dL   Troponins NOW   Result Value Ref Range    Troponin T <6 6 - 19 ng/L   Troponins in two (2) hours   Result Value Ref Range    Troponin T <6 6 - 19 ng/L   HCG Qual Serum   Result Value Ref Range    Beta-Hcg Qualitative Serum Negative Negative   D-DIMER   Result Value Ref Range    D-Dimer 0.29 0.00 - 0.50 ug/mL (FEU)   ESTIMATED GFR   Result Value Ref Range    GFR (CKD-EPI) 117 >60 mL/min/1.73 m 2   EKG   Result Value Ref Range    Report       [facility] Emergency Dept.    Test Date:  2024  Pt Name:    MARÍA NATHAN               Department: ER  MRN:        [id number]                      Room:       [facility]  Gender:     Female                       Technician: [id number]  :        1980                   Requested By:ER TRIAGE PROTOCOL  Order #:    [id number]                    Reading MD:    Measurements  Intervals                                Axis  Rate:       110                          P:          56  WI:         140                          QRS:        22  QRSD:       81                           T:          29  QT:         330  QTc:        447    Interpretive Statements  Sinus tachycardia  Left ventricular hypertrophy  Compared to ECG 2016 13:44:55  Sinus rhythm no longer present       EKG;.  Sinus tach at 110.  No ST elevation, no ST depression.  QTc is 447.  Compared to EKG from 2016.    RADIOLOGY  I have independently interpreted the diagnostic imaging associated  with this visit and am waiting the final reading from the radiologist.   My preliminary interpretation is as follows: See below  Radiologist interpretation:   DX-CHEST-PORTABLE (1 VIEW)   Final Result      No acute cardiopulmonary abnormality.               COURSE & MEDICAL DECISION MAKING    Patient will be discharged in stable condition    INITIAL ASSESSMENT, COURSE AND PLAN  Care Narrative: This is a 43-year-old female here for evaluation of chest pain.  She has had this pain for about a week, she has negative troponins x 2, negative D-dimer, unremarkable chest x-ray, and unremarkable EKG.  Patient has a heart score of 1, and she does have a long history of anxiety in the home secondary to having 9 children.  At    DISPOSITION AND DISCUSSIONS  I have discussed management of the patient with the following physicians and LEELA's: None    Discussion of management with other QHP or appropriate source(s): None    Escalation of care considered, and ultimately not performed: None    Barriers to care at this time, including but not limited to: Patient does not have established PCP.     Decision tools and prescription drugs considered including, but not limited to: Ativan.    FINAL DIAGNOSIS  Atypical chest pain  Anxiety       Electronically signed by: Stephen Whalen D.O., 1/26/2024 5:23 PM

## (undated) DEVICE — SET EXTENSION WITH 2 PORTS (48EA/CA) ***PART #2C8610 IS A SUBSTITUTE*****

## (undated) DEVICE — SET LEADWIRE 5 LEAD BEDSIDE DISPOSABLE ECG (1SET OF 5/EA)

## (undated) DEVICE — LACTATED RINGERS INJ 1000 ML - (14EA/CA 60CA/PF)

## (undated) DEVICE — Device

## (undated) DEVICE — GLOVE BIOGEL SZ 7 SURGICAL PF LTX - (50PR/BX 4BX/CA)

## (undated) DEVICE — KIT ANESTHESIA W/CIRCUIT & 3/LT BAG W/FILTER (20EA/CA)

## (undated) DEVICE — VACURETTE 9MM CURVED 10/PKG

## (undated) DEVICE — PROTECTOR ULNA NERVE - (36PR/CA)

## (undated) DEVICE — HEAD HOLDER JUNIOR/ADULT

## (undated) DEVICE — KIT ROOM DECONTAMINATION

## (undated) DEVICE — GLOVE BIOGEL INDICATOR SZ 7SURGICAL PF LTX - (50/BX 4BX/CA)

## (undated) DEVICE — DRESSING NON ADHERENT 3 X 4 - STERILE (100/BX 12BX/CA)

## (undated) DEVICE — TRAY SRGPRP PVP IOD WT PRP - (20/CA)

## (undated) DEVICE — TUBING CLEARLINK DUO-VENT - C-FLO (48EA/CA)

## (undated) DEVICE — GOWN WARMING STANDARD FLEX - (30/CA)

## (undated) DEVICE — ELECTRODE 850 FOAM ADHESIVE - HYDROGEL RADIOTRNSPRNT (50/PK)

## (undated) DEVICE — SODIUM CHL IRRIGATION 0.9% 1000ML (12EA/CA)

## (undated) DEVICE — SENSOR SPO2 NEO LNCS ADHESIVE (20/BX) SEE USER NOTES

## (undated) DEVICE — PAD SANITARY 11IN MAXI IND WRAPPED  (12EA/PK 24PK/CA)

## (undated) DEVICE — SUCTION INSTRUMENT YANKAUER BULBOUS TIP W/O VENT (50EA/CA)

## (undated) DEVICE — NEEDLE SPINAL NON-SAFETY 18 GA X 3 IN (25EA/BX)

## (undated) DEVICE — MASK ANESTHESIA ADULT  - (100/CA)

## (undated) DEVICE — NEPTUNE 4 PORT MANIFOLD - (20/PK)

## (undated) DEVICE — CANISTER SUCTION 3000ML MECHANICAL FILTER AUTO SHUTOFF MEDI-VAC NONSTERILE LF DISP  (40EA/CA)

## (undated) DEVICE — DETERGENT RENUZYME PLUS 10 OZ PACKET (50/BX)